# Patient Record
Sex: MALE | Race: WHITE
[De-identification: names, ages, dates, MRNs, and addresses within clinical notes are randomized per-mention and may not be internally consistent; named-entity substitution may affect disease eponyms.]

---

## 2018-06-21 NOTE — EKG
[facility]
                                    [address]
                                  [place], [place]  [address]
_________________________________________________________________________________________
                                                                 Signed   
 
 
Sinus rhythm with premature supraventricular complexes
Otherwise normal ECG
No previous ECGs available
Confirmed by TALISHA HENDERSON MD ([id number]) on 6/21/2018 6:27:12 PM
 
 
 
 
 
 
 
 
 
 
 
 
 
 
 
 
 
 
 
 
 
 
 
 
 
 
 
 
 
 
 
 
 
 
 
 
 
 
 
 
 
    Electronically Signed By: TALISHA HENDERSON MD  06/21/18 1827
_________________________________________________________________________________________
PATIENT NAME:     PARISH HURTADO                  
MEDICAL RECORD #: [id number]                     Electrocardiogram             
          ACCT #: [id number]  
DATE OF BIRTH:   [birth year]                                       
PHYSICIAN:   TALISHA HENDERSON MD                   REPORT #: [id number]
REPORT IS CONFIDENTIAL AND NOT TO BE RELEASED WITHOUT AUTHORIZATION

## 2018-09-20 ENCOUNTER — HOSPITAL ENCOUNTER (INPATIENT)
Dept: HOSPITAL 46 - ED | Age: 78
LOS: 4 days | Discharge: NURSING, REHAB OR LONG-TERM CARE | DRG: 871 | End: 2018-09-24
Attending: INTERNAL MEDICINE | Admitting: INTERNAL MEDICINE
Payer: MEDICARE

## 2018-09-20 VITALS — WEIGHT: 158.29 LBS | HEIGHT: 71 IN | BODY MASS INDEX: 22.16 KG/M2

## 2018-09-20 DIAGNOSIS — F03.91: ICD-10-CM

## 2018-09-20 DIAGNOSIS — N40.1: ICD-10-CM

## 2018-09-20 DIAGNOSIS — R33.8: ICD-10-CM

## 2018-09-20 DIAGNOSIS — N32.3: ICD-10-CM

## 2018-09-20 DIAGNOSIS — J44.9: ICD-10-CM

## 2018-09-20 DIAGNOSIS — Z79.4: ICD-10-CM

## 2018-09-20 DIAGNOSIS — N28.1: ICD-10-CM

## 2018-09-20 DIAGNOSIS — E11.9: ICD-10-CM

## 2018-09-20 DIAGNOSIS — K86.9: ICD-10-CM

## 2018-09-20 DIAGNOSIS — A40.3: Primary | ICD-10-CM

## 2018-09-20 DIAGNOSIS — K76.89: ICD-10-CM

## 2018-09-20 DIAGNOSIS — Z87.891: ICD-10-CM

## 2018-09-20 DIAGNOSIS — J96.11: ICD-10-CM

## 2018-09-20 DIAGNOSIS — R53.81: ICD-10-CM

## 2018-09-20 DIAGNOSIS — M54.9: ICD-10-CM

## 2018-09-20 DIAGNOSIS — K40.90: ICD-10-CM

## 2018-09-20 DIAGNOSIS — I48.91: ICD-10-CM

## 2018-09-20 DIAGNOSIS — J18.9: ICD-10-CM

## 2018-09-20 NOTE — EKG
[facility]
                                    [address]
                                  [place]  [address]
_________________________________________________________________________________________
                                                                 Signed   
 
 
Atrial flutter with variable AV block with premature ventricular or aberrantly
conducted complexes
Abnormal ECG
When compared with ECG of 20-SEP-2018 13:23, (Unconfirmed)
ST no longer depressed in Inferior leads
Confirmed by MESHA BLACK DO ([id number]) on 9/20/2018 9:11:32 PM
 
 
 
 
 
 
 
 
 
 
 
 
 
 
 
 
 
 
 
 
 
 
 
 
 
 
 
 
 
 
 
 
 
 
 
 
 
 
 
    Electronically Signed By: MESHA BLACK DO  09/20/18 2111
_________________________________________________________________________________________
PATIENT NAME:     PARISH HURTADO JR                 
MEDICAL RECORD #: [id number]                     Electrocardiogram             
          ACCT #: [id number]  
DATE OF BIRTH:   [birth year]                                       
PHYSICIAN:   MESHA BLACK DO                     REPORT #: [id number]
REPORT IS CONFIDENTIAL AND NOT TO BE RELEASED WITHOUT AUTHORIZATION

## 2018-09-20 NOTE — NUR
PATIENT RESTING IN BED, TACHYPNEIC WITH NON-PRODUCTIVE COUGH. ASSISTED TO
BEDSIDE COMDODE WITH 1PA, ABLE TO VOID SMALL AMOUNT OF URINE. PATIENT NOW
RESTING IN BED AGAIN. REPORTS ABD DISCOMFORT, SCHEDULED TYLENOL GIVEN. DENIES
FURTHER NEEDS. BLADDER SCAN DONE, 115 ML NOTED IN BLADDER, MD AWARE. CALL
LIGHT WITHIN REACH, BED ALARM ON.

## 2018-09-20 NOTE — NUR
PATIENT RESTING IN BED WITH EYES CLOSED, BREATHING IS EVEN AND UNALABORED.
FLACC SCORE OF 0. ASSESSMENT DONE. PATIENT DENIES NEEDS AT THIS TIME. CALL
LIGHT WITHIN REACH, BED ALARM ON.

## 2018-09-20 NOTE — EKG
[facility]
                                    [address]
                                  [place], [place]  [address]
_________________________________________________________________________________________
                                                                 Signed   
 
 
Atrial flutter with 2:1 AV conduction
Nonspecific ST abnormality
Abnormal ECG
When compared with ECG of 20-SEP-2018 12:11, (Unconfirmed)
ST less depressed in Inferior leads
Confirmed by MESHA BLACK DO ([id number]) on 9/20/2018 9:11:14 PM
 
 
 
 
 
 
 
 
 
 
 
 
 
 
 
 
 
 
 
 
 
 
 
 
 
 
 
 
 
 
 
 
 
 
 
 
 
 
 
    Electronically Signed By: MESHA BLACK DO  09/20/18 2111
_________________________________________________________________________________________
PATIENT NAME:     PARISH HURTADO                  
MEDICAL RECORD #: [id number]                     Electrocardiogram             
          ACCT #: [id number]  
DATE OF BIRTH:   [birth year]                                       
PHYSICIAN:   MESHA BLACK DO                     REPORT #: [id number]
REPORT IS CONFIDENTIAL AND NOT TO BE RELEASED WITHOUT AUTHORIZATION

## 2018-09-20 NOTE — XMS
PreManage Notification: PARISH HURTADO MRN:[id number]
 
Security Information
 
Security Events
No recent Security Events currently on file
 
 
 
CRITERIA MET
------------
- Group Notification
 
 
CARE PROVIDERS
There are no care providers on record at this time.
 
Michelle has no Care Guidelines for this patient.
 
JAIDEN VISIT COUNT (12 MO.)
-------------------------------------------------------------------------------------
2 [facility]
-------------------------------------------------------------------------------------
TOTAL 2
-------------------------------------------------------------------------------------
NOTE: Visits indicate total known visits.
 
ED/C VISIT TRACKING (12 MO.)
-------------------------------------------------------------------------------------
09/20/2018 12:08
[facility] [place]
 
TYPE: Emergency
 
COMPLAINT:
- SOB
-------------------------------------------------------------------------------------
06/20/2018 13:07
[facility] [place]
 
TYPE: Emergency
 
COMPLAINT:
- DIFFICULTY BREATHING,ABD PAIN
 
DIAGNOSES:
- Long term (current) use of aspirin
- Chronic obstructive pulmonary disease, unspecified
- Unspecified abdominal pain
- LONG TERM (CURRENT) USE OF ORAL HYPOGLYCEMIC DRUGS
- Other long term (current) drug therapy
- Long term (current) use of opiate analgesic
- Shortness of breath
- Unspecified abdominal pain
- Heart failure, unspecified
- Chronic pain syndrome
- Personal history of nicotine dependence
- Type 2 diabetes mellitus without complications
-------------------------------------------------------------------------------------
 
 
 
INPATIENT VISIT TRACKING (12 MO.)
No inpatient visits to display in this time frame
 
[email or web address]

## 2018-09-20 NOTE — NUR
PATIENT RESTING IN BED WITH EYES CLOSED, BREATHING IS EVEN AND UNLABORED. CALL
LIGHT WITHIN REACH, BED ALARM ON.

## 2018-09-20 NOTE — NUR
PATIENT RESTING WITH EYES CLOSED, BREATHING IS EVEN AND UNLABORED, RR IS 17,
O2 SATURATION IS 95% ON 2L O2. FLACC SCORE OF 0. CALL LIGHT WITHIN REACH, BED
ALARM ON.

## 2018-09-20 NOTE — EKG
[facility]
                                    [address]
                                  [place], [place]  [address]
_________________________________________________________________________________________
                                                                 Signed   
 
 
Atrial flutter with 2:1 AV conduction
Nonspecific ST abnormality
Abnormal QRS-T angle, consider primary T wave abnormality
Abnormal ECG
When compared with ECG of 20-SEP-2018 12:10, (Unconfirmed)
No significant change was found
Confirmed by MESHA BLACK DO ([id number]) on 9/20/2018 9:11:12 PM
 
 
 
 
 
 
 
 
 
 
 
 
 
 
 
 
 
 
 
 
 
 
 
 
 
 
 
 
 
 
 
 
 
 
 
 
 
 
    Electronically Signed By: MESHA BLACK DO  09/20/18 2111
_________________________________________________________________________________________
PATIENT NAME:     PARISH HURTADO                  
MEDICAL RECORD #: [id number]                     Electrocardiogram             
          ACCT #: [id number]  
DATE OF BIRTH:   [birth year]                                       
PHYSICIAN:   MESHA BLACK DO                     REPORT #: [id number]
REPORT IS CONFIDENTIAL AND NOT TO BE RELEASED WITHOUT AUTHORIZATION

## 2018-09-21 NOTE — NUR
PT BACK TO BED FROM COMMODE. PT HAS DIFFICULTY REMEMBERING HIS BECERRA CATH, AND
WHAT IT IS. PT IS IMPULSIVE WITH MOVEMENTS, HOWEVER IS POLITE AND COOPERATIVE.

## 2018-09-21 NOTE — NUR
Nurse deferred PT eval today due to pt. experiencing chest pain. Will
re-attempt eval as mediacally appropriate.

## 2018-09-21 NOTE — NUR
PATIENT RESTING IN BED WITH EYES CLOSED, BREATHING IS EVEN NAD UNLABORED, RR
IS 18, O2 SATURATION % ON 2L O2 VIA NC. FLACC SCORE OF 0. CALL LIGHT
WITHIN REACH, BED ALARM ON.

## 2018-09-21 NOTE — NUR
APPROACHED BY CHARGE NURSE PATIENT HAS AWOKEN AND IS VERY ANXIOUS, HR
JUMPINGING INTO 'S AND O2 HAD TO BE TURNED UP TO 6L/NC TO GET SATS UP
FROM 88%, BUT PATIENT HAD PULLED HIS O2 OFF. PATIENT EXTREMELY ANXIOUS. PM
MEDS HAVE BEEN GIVEN.  WAS CALLED AND AN ORDER FOR 1MG IV ATIVAN WAS
GIVEN. THIS CALMED PATIENT DOWN RIGHT AWAY. PATIENT'S HEART RATE AND BREATHING
CAME BACK DWN INTO EXCEPTABLE PARAMETERS. IV LOPRESSOR HAD BEEN ORDER, BUT
 SAID TO HOLD THE DOSE SINCE THE PATIENT WAS STABLIZING, BUT TO STILL
GIVE HIM HIS SEROQUEL IF HE WOKE UP. PATIENT RESTING QUEITLY NOW. SATS 95-96%
ON 4L/NC.

## 2018-09-21 NOTE — NUR
PT CONTIOUES TO C/O CHEST PAIN, NEW ORDERS EKG TO BE COMPLETED, AND A NEW IV
LINE STARTED ALSO AT THIS TIME. DR WILL COME AND SEE PT

## 2018-09-21 NOTE — EKG
[facility]
                                    [address]
                                  [place]  [address]
_________________________________________________________________________________________
                                                                 Signed   
 
 
Atrial fibrillation with rapid ventricular response
Abnormal QRS-T angle, consider primary T wave abnormality
Abnormal ECG
When compared with ECG of 20-SEP-2018 14:18,
Atrial fibrillation has replaced Atrial flutter
Non-specific change in ST segment in Inferior leads
Confirmed by MESHA BLACK DO ([id number]) on 9/21/2018 3:51:37 PM
 
 
 
 
 
 
 
 
 
 
 
 
 
 
 
 
 
 
 
 
 
 
 
 
 
 
 
 
 
 
 
 
 
 
 
 
 
 
    Electronically Signed By: MESHA BLACK DO  09/21/18 1551
_________________________________________________________________________________________
PATIENT NAME:     PARISH HURTADO                  
MEDICAL RECORD #: [id number]                     Electrocardiogram             
          ACCT #: [id number]  
DATE OF BIRTH:   [birth year]                                       
PHYSICIAN:   MESHA BLACK DO                     REPORT #: [id number]
REPORT IS CONFIDENTIAL AND NOT TO BE RELEASED WITHOUT AUTHORIZATION

## 2018-09-21 NOTE — NUR
PT HEART RATE IN 'S 'S AT TIMES, NEW ORDERS RECEIVED, PT IS IN
AFIB WITH RVR AND WAS GIVEN 5MG LOPRESSOR IVP AND THEN 25MG PO. PT IS UP TO
THE BEDSIDE COMMODE ALSO AT THIS TIME. HE WAS C/O CHEST PAIN WITH THIS
ELEVATED HEART RATE. ONCE HEART RATE DECREASED TO 'S HIS CHEST PAIN WAS
NONE.

## 2018-09-21 NOTE — NUR
UPDATED DR. BLACK REGARDING PATIENT'S LOW URINE OUTPUT. DR. BLACK STATES TO
CONTINUE TO MONITOR URINE OUTPUT UNTIL MORNING, WILL RE-EVALUATE NEED FOR
CATHETER IN AM.

## 2018-09-21 NOTE — NUR
BLADDER SCANNED PT  ML PT NOT ABLE TO VOID,
CALLED  TO UPDATE, ORDER GIVEN FOR
BECERRA CATH PLACEMENT.

## 2018-09-21 NOTE — NUR
16FR BECERRA PLACED USING STERILE TECHNIQUE, PT TOLERATED WELL, SECURED.
OBTAINED 400ML DARK YELLOW URINE. PT STATES HE IS COMFORTABLE. DAUGHTER HAS
ARRIVED AND IS SITTING WITH PT.

## 2018-09-21 NOTE — NUR
REPORTS 8/10 PAIN IN ABD AND RADIATES TO FLANKS, PATIENT STATES "I THINK IT IS
FROM ALL THE COUGHING I HAVE BEEN DOING." PRN OXYCODONE GIVEN. PATIENT
ASSISTED TO BEDSIDE COMMODE, WAS ABLE TO VOID. NOW RESTING IN BED AGAIN,
BREATHING IS TACHY, O2 SATURATION IS 95% ON 2L O2 VIA NC. RT IN TO DO
BREATHING TREATMENT. PATIENT DENIES FURTHER NEEDS AT THIS TIME. CALL LIGHT
WITHIN REACH, BED ALARM ON.

## 2018-09-21 NOTE — NUR
PATIENT STILL RESTING QUIETLY IN BED. RESPIRATIONS EVEN AND REGULAR AT A RATE
OF 18. EYES CLOSED AND APPEARS TO BE RESTING COMFORTABLLY.

## 2018-09-21 NOTE — NUR
PATIENT RESTING PEACEFULLY ON 1.5L/NC IN BED WITH HEAD OF BED ABOUT 30
DEGREES. EYES CLOSED RESPIRATIONS EVEN AND REGULAR AND PATIENT APPEARS IN NO
DISTRESS.

## 2018-09-21 NOTE — NUR
IV SITE INTACT, NO REDNESS OR SWELLING NOTED, FLUIDS INFUSING EASILY. PT AWAKE
AND ALERT APPEARS SLIGHTLY AGITATED, PT REPORTS 6/10 PAIN IN ABD/FLANK. 10MG
PO OXYCODONE GIVEN. PT NOT ABLE TO EAT BREAKFAST, BUT DRANK AN ENSURE. PT
ALERT AND ORIENTED X4, HOWEVER IS VERY FORGETFUL. PT UP TO BEDSIDE COMMODE,
SLIGHTLY IMPULSIVE WHEN GETTING UP. PT DIRECTABLE AND POLITE. PT DENIES NAUSEA
AND SOB AT THIS TIME, SPUTUM SAMPLE OBTAINED.

## 2018-09-21 NOTE — NUR
PT ARRIVED ON THE UNIT IN BED FROM CCU.  PT IS ANXIOUS, HARD OF HEARING, AND
BLIND IN ONE EYE, POOR SIGHT IN OTHER EYE.  PT IS FORGETFUL, OFTEN REPEATS
HIMSELF.  PT USES O2, CHRONIC AT 1.5L.  BECERRA CATH IN PLACE.  PT USES BSC.  PT
DISLIKES ANY MOISTURE ON SKIN, DOES NOT LIKE TO USE WIPES.  PT LIKES TO WIPE
HIMSELF.  PT ON TELE, TACHY IN .

## 2018-09-22 NOTE — NUR
WAS NOTIFIED THAT PATIENT HAS HAD LOW URINE OUTPUT AND HE INFORMED
NURSING TO MONITOR AND HE WOULD LOOK AT THE ISSUE AND PATIENT'S LABS IN THE
MORNING.

## 2018-09-22 NOTE — NUR
PT SLEPT OFF AND ON THROUGHOUT DAY. FREQUENT COUGH, UNPRODUCTIVE. [place]. TELE
IRREGULAR. SS INSULIN STARTED FOR BS . PT REPORTS SOME CHRONIC BACK
PAIN. BECERRA DRAINING LIGHT YELLOW QS. 4LNC. LUNG SOUND COARSE.

## 2018-09-22 NOTE — NUR
pt was assisted from chair back to bed. pt is resting safely with call light
in reach and bed alarm on. pt asked for a hot pack for his lower back.

## 2018-09-22 NOTE — NUR
PT SLEEPING SOUNDLY ALL MORNING.  WILL WAKE FOR CARES, COOPERATIVE, NO NOTED
ANXIETY.  CHANGED OXYGEN TO NASAL CANULA.

## 2018-09-22 NOTE — NUR
ADMINSTERED SCHEDULED MEDS. HAD TO WAKE PT TO TAKE THEM. DENIES CONCERNS.
STATES HE IS IN THE HOSPITAL FOR BREATHING BUT DOESN'T KNOW THE TOWN AND ASKED
WHEN THE MAIL WOULD BE DELIVERED. PT BACK TO SLEEP QUICKLY.

## 2018-09-22 NOTE — NUR
pt is resting in bed safely with call light in reach. pt asked for an ensure.
pt was offered to get up and sit in the chair, but pt said his back is hurting
too much for that right now.

## 2018-09-22 NOTE — NUR
PATIENT'S SATS DROPPING TO 88% ON 4L/NC. PATIENT IS MOUTH BREATHING. PATIENT
PLACED ON 4L/OXYMASK AND BACK TO 95% AT THIS TIME.

## 2018-09-22 NOTE — NUR
RN DEFERRED PATIENT'S PHYSICAL THERAPY EVALUATION TO THIS AFTERNOON SECONDARY
TO PATIENT HAD A ROUGH NIGHT WITH O2 SATURATION, MENTAL STATUS, AND CHEST
PAINS. WILL RE-ATTEMPT LATER TODAY.

## 2018-09-22 NOTE — NUR
VITALS AND I&OS DONE AND CHARTED. NOTIFIED RN ALANNA OF LOW OUTPUT. BED WEIGHT
DONE AND CHARTED. GARBAGES EMPTIED. BED SIDE TABLE AND CALL LIGHT IN REACH. PT
SLEEPING WHEN I LEFT THE ROOM.

## 2018-09-22 NOTE — NUR
REPORT RECEIVED FROM HILARY CANNON. PT ASLEEP AT TIME OF REPORT BUT NOW WORKING
WITH PHYS THER. APPEARS TO BE TOLERATING WELL.

## 2018-09-22 NOTE — NUR
pt is resting in bed safely with call light in reach. pt did not want anything
for breakfast, but did drink some water. pt did not need anything else and
appears to be very sleepy, nurse aware.

## 2018-09-22 NOTE — NUR
PT'S DAUGHTER CALLED REQUESTING UPDATE.  GAVE UPDATE ON OVERNIGHT EVENTS.  SHE
HAS QUESTIONS ABOUT HIS HEART RATE AND  CHEST PAINS THAT HAS HAPPENED PRIOR TO
THIS ILLNESS.  RN ANSWERED WHAT I COULD AND DIRECTED HER TO SPEAK WITH HIS PCP
OR THE HOSPITALIST WHEN SHE IS AT THE HOSPITAL.  DAUGHTER IS NOT FEELING WELL
SO IS NOT GOING TO VISIT TODAY.

## 2018-09-22 NOTE — NUR
RECIEVED BEDSIDE REPORT FROM HILARY MONDRAGON.  PT SLEEPING SOUNDLY, SNORING.  DID
NOT WAKE TO VOICES IN ROOM.  PT HAD ORDER FOR 50MG SEROQUEL ONCE FROM LAST
NIGHT, NOT GIVEN DUE TO PT SLEEPING. MD AWARE. CHARTED AS NOT GIVEN.  PER MD,
RESUME NORMAL SCHEDULEING TODAY.  PT ON 4L O2 VIA OXYMASK.

## 2018-09-23 NOTE — NUR
PATIENT HAD A PRETTY GOOD NIGHT EXCEPT FOR NEEDING A BREATHING TREATMENT IN
THE MIDDLE OF THE NIGHT WHEN HE GOT MORE WHEEZY AND THEN AGAIN AFTER 6 AM
PATIENT BECAME VERY ANXIOUS SAID HE COULDN'T BREATH, BUT SATS WERE IN THE HIGH
90'S. PATIENT WAS GIVEN A BREATHING TREATMENT AND SEEMED TO BE MOVING AIR
FAIRLY WELL BUT HAVING EXTREME ANXIETY. CALLED DR. LY AND GOT AN ORDER FOR
0.5MG IV ATIVAN AND PATIENT CALLED DOWN GOT BACK IN BED, RELAXED HEART RATE
DECREASED FROM 'S DOWN TO THE 'S. PATIENT ALSO HAD A LARGE
BOWEEL MOVEMENT. PATIENT RESTING QUIETLY WHEN AM REPORT GIVEN.

## 2018-09-23 NOTE — NUR
VITALS AND BLOOD SUGAR DONE AND CHARTED. INFORMED RN OF RESULTS BEDSIDE TABLE
AND CALL LIGHT IN REACH.

## 2018-09-23 NOTE — NUR
PATIENT AWAKE AND WANTED TO SIT UP. PATIENT UP IN RECLINER WITH CHAIR ALARM.
PATIENT IS WHEEZING MORE AND SATS DROPPED TO 88% ON THE NC SO SWITCHED TO
4L/OXYMASK AND RT CALLED TO COME GIVE A PRN NEB.

## 2018-09-23 NOTE — NUR
PATIENT RESTING IN BED, PATIENT DOES NOT AWAKE WHEN THIS CNA SPEAKS TO
PATIENT. RN IN ROOM WITH PATIENT AT THIS TIME. CALL LIGHT IN REACH. NO OTHER
NEEDS.

## 2018-09-23 NOTE — NUR
PT APPEARS MORE LETHARGIC THIS SHIFT, SLEPT MOST OF DAY.  WAKES EASILY TO
VOICE AND TOUCH.  PT C/O PAIN MORE,PRN TYLENOL EFFECTIVE "FOR ABOUT 5
MINUTES".  NARCOTICS DC'D DUE TO DROWSINESS.  PT TOLERATES WEANING O2 DOWN TO
2L VIA NC.  PER MD, KEEP O2 BETWEEN 89%-92%.  WET, PRODUCTIVE COUGH MORE
FREQUENT.  PT REPORTS IMPROVED APPITITE.  EATS MOST OF EACH MEAL AND DRINKS
ENSURES.  PT REFUSED SHOWER AND BED BATH MULTIPLE TIMES.

## 2018-09-23 NOTE — NUR
ASSESSMENT COMPLETE. SCHEDULED MEDICATIONS ADMINISTERED. PRN PAIN MEDICATION
GIVEN FOR 8/10 PAIN IN ABD. PT SITTING ON EDGE OF BED. CRACKLES IN BILATERAL
LOWER LOBES. BECERRA DRAINING WNL. CONTINUOUS PULSE OX WNL ON 2 L NC. TELE #4-
IRREGULAR HR. IV SALINE LOCKED, FLUSHES WNL. ENSURE AND ICE WATER GIVEN PER PT
REQUEST. ALERT AND ORIENTED X2, WHEN ASKED WHERE HE WAS PT STATED, "AT THE
[employer]" AND WAS REORIENTED, PT WAS UNSURE OF CURRENT YEAR BUT WAS ABLE
TO STATE HIS BIRTHDAY. CALL LIGHT IN REACH. BED ALARM ON.

## 2018-09-23 NOTE — NUR
PT REPORTED THAT HE FEELS SOB, O2 SATS DOWN TO 82.  INCREASED WITH
REASSURANCE.  RT RUTH CALLED, HE WILL COME OVER TO ASSIST.

## 2018-09-23 NOTE — NUR
PATIENT HAS HAD HIS BREATHING TREATMENT AND IS RESTING QUIETLY IN THE RECLINER
NOW. WHEEZING HAS RESOLVED. SATS 97% ON 4L/OXYMASK.

## 2018-09-23 NOTE — NUR
GAVE PT A TURKEY SAND BOX PER HIS REQUEST AND OK FROM HIS RN ALANNA. FRESH
WATER GIVEN. CALL LIGHT AND BEDSIDE TABLE IN REACH.

## 2018-09-23 NOTE — NUR
THIS CNA ASKED PATIENT IF HE WOULD LIKE TO SHOWER. PATIENT APPEARED TO BE
AGITATED AND STATED THAT HE WANTS "YOU PEOPLE TO LEAVE ME ALONE, IM SICK AND
DONT FEEL GOOD AND WANT TO BE LEFT ALONE". THIS CNA EXPLAINED TO PATIENT THE
BENEFITS OF A SHOWER AND THAT IT MAY HELP HIM FEEL BETTER, PATIENT AGITATION
INCREASED AND PATIENT STATED THAT "YOU CANT FORCE ME TO TAKE A SHOWER, IM NOT
TAKING ONE." PATIENT RESTING IN BED, CALL LIGHT IN REACH. RN NOTIFIED, NO
OTHER NEEDS AT THIS TIME.

## 2018-09-23 NOTE — NUR
PATIENT SITTING UP IN BEDSIDE RECLINER, CALL LIGHT IN REACH, CHAIR ALARM ON.
NO OTHER NEEDS AT THIS TIME. LINENS CHANGED.

## 2018-09-23 NOTE — NUR
IN ROOM TO ASSIST PT TO LAY BACK IN BED. CONTINUOUS PULSE OX WNL ON 2 L NC. NO
NEEDS AT THIS TIME. BED ALARM ON, CALL LIGHT IN REACH.

## 2018-09-23 NOTE — NUR
VITALS AND I&OS DONE AND CHARTED. GARBAGES EMPTIED. BEDSIDE TABLE AND CALL
LIGHT IN REACH. FRESH WATER GIVEN.

## 2018-09-23 NOTE — NUR
BECERRA WAS CLAMPED FOR 3 HRS.  PT HAD 150ML OUTPUT WHEN UNCLAMPED.  PT DEINED
FEELING THE NEED TO VOID.  THERE WAS A LARGE AMOUNT OF URINE ON BEDSHEETS WHEN
PT MOVED TO CHAIR.  DR LY NOTIFIED, WILL RECOMMEND FOLLOW UP WITH UROLOGY
AFTER DISCHARGE AND WE WILL LEAVE BECERRA FOR NOW.

## 2018-09-23 NOTE — NUR
RECIEVED BEDSIDE REPORT FROM HILARY MONDRAGON.  PT SLEEPING SOUNDLY, TALKING
INCOHERANTLY IN HIS SLEEP.  O2 AT 4L VIA OXYMASK.  PT APPEARS COMFORTABLE IN
BED AT THIS TIME.

## 2018-09-23 NOTE — NUR
RECIEVED REPORT FROM HILARY CANNON. PT SITTING UP IN BED, SLEEPING, AWAKES
EASILY. CONTINUOUS PULSE OX- O2 SATURATION WNL ON 2 L NC. BECERRA DRAINING WNL.
IV SALINE LOCKED. TELE #4- IRREGULAR HR. BED ALARM ON. CALL LIGHT IN REACH

## 2018-09-23 NOTE — NUR
PT REMAINS VERY SLEEPY.  WILL WAKE TO LOUD VOICE AND TOUCH.  PT SEEMS MORE
DISORIENTED TODAY THAN PREVIOUSLY.  PT LUNGS ARE VERY WHEEZY AND COARSE.  PER
PRINCE, RT, TRIAL REDUCTION IN 02 TO KEEP SATS AT MID-90S.  DECREASED O2 TO
2L NASAL CANULA. GOOD URINE OUTPUT.  TELEPHONE ORDER FROM DR LY TO CLAMP
BECERRA TO ASSESS NEED TO VOID. BECERRA CLAMPED AT 1020.

## 2018-09-23 NOTE — NUR
PT UP TO CHAIR WITH PHYSICAL THERAPY.  PT TOLERATED WELL, CHAIR ALARM ON.  PT
SEEMS MORE AWAKE, DOZES OFF, BUT WAKES TO VOICE.  REPORTS APPITITE IS
IMPROVING.  ONE-TIME DOSE OF CATCH UP MEDS GIVEN.

## 2018-09-23 NOTE — NUR
PT HAS BEEN UP IN CHAIR.  TOLERATING WELL.  BECERRA CATH LEAKING.  RN WILL
REASSESS.  PT LAYING BACK DOWN IN BED.

## 2018-09-23 NOTE — NUR
APPROCHED PT SEVERAL TIMES RE: TAKING A SHOWER.  PT EMPHATICALLY DECLINED TO
GET IN THE SHOWER CITING PAIN AND FATIGUE.  PT WAS GIVEN PRN TYLENOL, STILL
DECLINED.  PT SLEEPY MOST OF SHIFT.  MD AWARE.

## 2018-09-23 NOTE — NUR
PATIENT CONTINUES TO SIT IN THE RECLINER AND IS GETTING HUNGRY. PATIENT DRANK
AN INSURE AND IS NOW EATING A TURKEY SANDWHICH. SATS 99% ON 4L/NC.

## 2018-09-24 ENCOUNTER — HOSPITAL ENCOUNTER (INPATIENT)
Dept: HOSPITAL 46 - MS | Age: 78
LOS: 6 days | Discharge: HOME HEALTH | DRG: 91 | End: 2018-09-30
Attending: INTERNAL MEDICINE | Admitting: INTERNAL MEDICINE
Payer: MEDICARE

## 2018-09-24 VITALS — BODY MASS INDEX: 21.57 KG/M2 | HEIGHT: 71 IN | WEIGHT: 154.1 LBS

## 2018-09-24 DIAGNOSIS — Z79.51: ICD-10-CM

## 2018-09-24 DIAGNOSIS — I48.0: ICD-10-CM

## 2018-09-24 DIAGNOSIS — R33.8: ICD-10-CM

## 2018-09-24 DIAGNOSIS — E11.9: ICD-10-CM

## 2018-09-24 DIAGNOSIS — K86.9: ICD-10-CM

## 2018-09-24 DIAGNOSIS — R93.49: ICD-10-CM

## 2018-09-24 DIAGNOSIS — N32.0: ICD-10-CM

## 2018-09-24 DIAGNOSIS — R26.81: Primary | ICD-10-CM

## 2018-09-24 DIAGNOSIS — J13: ICD-10-CM

## 2018-09-24 DIAGNOSIS — K76.89: ICD-10-CM

## 2018-09-24 DIAGNOSIS — Z87.891: ICD-10-CM

## 2018-09-24 DIAGNOSIS — K40.90: ICD-10-CM

## 2018-09-24 DIAGNOSIS — R53.1: ICD-10-CM

## 2018-09-24 DIAGNOSIS — Z79.84: ICD-10-CM

## 2018-09-24 DIAGNOSIS — N28.1: ICD-10-CM

## 2018-09-24 DIAGNOSIS — K21.9: ICD-10-CM

## 2018-09-24 DIAGNOSIS — Z79.899: ICD-10-CM

## 2018-09-24 DIAGNOSIS — F03.91: ICD-10-CM

## 2018-09-24 DIAGNOSIS — E78.5: ICD-10-CM

## 2018-09-24 DIAGNOSIS — J44.0: ICD-10-CM

## 2018-09-24 DIAGNOSIS — Z79.82: ICD-10-CM

## 2018-09-24 DIAGNOSIS — N40.1: ICD-10-CM

## 2018-09-24 DIAGNOSIS — Z90.49: ICD-10-CM

## 2018-09-24 DIAGNOSIS — M54.9: ICD-10-CM

## 2018-09-24 DIAGNOSIS — G89.29: ICD-10-CM

## 2018-09-24 DIAGNOSIS — J96.11: ICD-10-CM

## 2018-09-24 DIAGNOSIS — F39: ICD-10-CM

## 2018-09-24 DIAGNOSIS — Z99.81: ICD-10-CM

## 2018-09-24 PROCEDURE — G8987 SELF CARE CURRENT STATUS: HCPCS

## 2018-09-24 PROCEDURE — G8980 MOBILITY D/C STATUS: HCPCS

## 2018-09-24 PROCEDURE — G8988 SELF CARE GOAL STATUS: HCPCS

## 2018-09-24 NOTE — NUR
REPLACED CONT PULSE OX USING ALLIGATOR CLIP ON EAR.  UNABLE TO MAINTAIN
READING USING DISPOSIBLE METERS ON FINGER, TOE, OR FOREHEAD.   PT IN WORKING
WITH PT.

## 2018-09-24 NOTE — NUR
PATIENT IN BED. PATIENT STARTED COUGHING HARD, CNA RAISED HED OF BED. RN CAME
INTO ROOM. FRESH WATER GIVEN. CALL LIGHT IN REACH. NO FURTHER NEEDS AT THIS
TIME.

## 2018-09-24 NOTE — NUR
IN ROOM TO ADMINISTER SCHEDULED BP MEDICATION. MEDICATION ADMINISTERED.
CONTINUOUS PULSE OX SATURATIONS WNL, OXYGEN TITRATED TO 1.5 L VIA NC. BECERRA
CARE COMPLETE. CALL LIGHT IN REACH, BED ALARM ON.

## 2018-09-24 NOTE — NUR
PT SLEPT ON/OFF THROUGHOUT SHIFT. PRN PAIN MEDICATION GIVEN X2. PT TITRATED TO
1.5 L NC AND SATURATED WITHIN ORDERED LIMITS, CONTINUOUS PULSE OX. TELE #4
IRREGULAR HR. . BECERRA DRAINING WNL. IV SALINE LOCKED. PT WOKE AROUND
0300 THIS MORNING WITH SOB AND ANXIETY, O2 SATURATIONS WNL, PRN BREATHING TX
ADMINISTERED, PT ABLE TO FALL BACK ASLEEP WITH NO FURTHER NEEDS. SCHEDULED BP
MEDICATIONS ADMINISTERED Q6H. PT ALERT AND ORIENTED X2, PLACE AND YEAR
UNKNOWN.

## 2018-09-24 NOTE — NUR
PT COMPLAINS OF ABD PAIN.  DIAG IMAGING IN ROOM TO DO ECHO.  DAUGHTER AT
BEDSIDE, UPSET DUE TO A DEATH IN THE FAMILY.  PT DOES NOT SEEM TO BE UPSET
ABOUT IT.  PT SEEMS MORE ANXIOUS ABOUT HIS BREATHING, BUT HIS LUNGS SOUND
BETTER.  CONT. PULSE OX OFF UNTIL ECHO COMPLETE.  ATTEMPTED TO GET PT IN
SHOWER, BUT PT HAS DECLINED SO FAR.  WILL CONTINUE TO ATTEMPT.

## 2018-09-24 NOTE — NUR
RECEIVED REPORT FROM HILARY CANNON. PT LAYING IN BED, APPEARS TO BE SLEEPING.
CONTINUOUS PULSE OX- WNL ON 2 LITERS OXYGEN VIA NC. TELE 4- SINUS RHYTHM.
BECERRA DRAINING WNL. CALL LIGHT NEXT TO PT.

## 2018-09-24 NOTE — NUR
PT REQUESTS PRN BREATHING TREATMENT.  CALLED RT TO PROVIDE TREATMENT.  RT FARNAZ
WILL BE DOWN IN A FEW MINTUES.

## 2018-09-24 NOTE — NUR
PRN PAIN MEDICATION GIVEN TO PT FOR PAIN 7/10 IN ABD AND BACK. ASSISTED PT
BACK TO BED. WARM BLANKET GIVEN TO PT. CALL LIGHT IN REACH.

## 2018-09-24 NOTE — NUR
PT HAD EPISODE OF SEVERE COUGHING AND HYPOXIA (70S).  CHANGED O2 TO OXYMASK AT
2L, MAINTAINED SAT LEVEL AT 95%.  PT APPEARS COMFORTABLE IN BED AT THIS TIME.
SUCTION AT BEDSIDE.  PT IS VERY ANXIOUS, BUT CALMS WITH PRESENCE AND TALK.  PT
REPORTS COUGHING UP "JUNK".

## 2018-09-24 NOTE — NUR
PT CHANGED TO SWING BED.  ONE EPISODE OF SEVERE COUGHING THAT CAUSED GAGGING.
NO EMISIS.  AFIB CONVERTED TO NSR THIS MORNING.  MAINTAINED NSR, HR 70'S. O2
VIA NC AT 2L, OXYMASK PRN.  PT REPORTS COUGHING, BUT NOTHING COMING UP.
DECREASED APITITE, BUT DRINKS ENSURES WELL.  PT APPEARS MORE AWAKE.  LIDOCAINE
PATCH ON BACK AND PRN TYLENOL EFFECTIVE FOR PAIN CONTROL.  MULTIPLE LARGE BM
THIS SHIFT.  BECERRA CATH IN PLACE, DRAINING QS YELLOW URINE.

## 2018-09-24 NOTE — NUR
IN ROOM TO CHECK ON PT. PT ASLEEP IN BED, AWAKES EASILY. CONTINUOUS PULSE OX
WNL ON 1.5 L OXYGEN VIA NC. BED ALARM ON, CALL LIGHT IN REACH.

## 2018-09-24 NOTE — NUR
VITALS AND I&OS DONE AND CHARTED. GARBAGES EMPTIED. BEDSIDE TABLE AND CALL
LIGHT IN REACH. PT NEEDS NOTHING AT THIS TIME.

## 2018-09-24 NOTE — NUR
CNA REPORTED THAT PT'S IV SITE WAS STILL BLEEDING AFTER TERESA PRESSURE AND
WRAPPING.  RN ASSESSED SITE, NO LONGER BLEEDING, RN APPLIED ANOTHER LAYER OF
WRAP.  WILL REASSESS SHORTLY.

## 2018-09-24 NOTE — NUR
PATIENT WAS ADMITTED AT HIGH RISK FOR MALNUTRITION DUE TO POOR APPETITE.  HE
LIVES WITH HIS DAUGHTER. HE HAS DEMENTIA.  HE SEEMS TO BE EATING DINNER WELL
BUT DOESN'T ALWAYS EAT BREAFKAST BUT WILL DRINK ENSURE.  CURRENT DIET: 1800
ADA.  BLOOD SUGARS IN CONTROL. POC GLUCOSE FROM 9/23: 146, 181, 107, 103.
LANTUS D/C'D.  CONTINUE TO PROVIDE ENSURE/GLUCERNA IF PATIENT DOESN'T EAT A
MEAL.  WILL CONTINUE TO MONITOR.

## 2018-09-24 NOTE — NUR
PATIENT UP TO BSC AND BACK TO SITTING ON SIDE OF BED
1 PERSON ASSIST. CALL LIGHT IN REACH. NO
FURTHER NEEDS AT THIS TIME.

## 2018-09-24 NOTE — NUR
RECIEVED BEDSIDE REPORT FROM HILARY CHAN AND HILARY CARRILLO.  PT WAS RESTING IN
BED, WAKES EASILY TO VOICE.  PT HAD BM X2.  TYLENOL GIVEN FOR BACK PAIN X2.
TELE #4 SHOWS IRREGULAR AND TACHY.  IV S/L AND NEEDS CHANGED TODAY. BECERRA IN
PLACE, NO LEAKING NOTED OVERNIGHT.

## 2018-09-24 NOTE — NUR
ASSESSMENT COMPLETE. PT C.O HEADACHE, PRN PAIN MEDICATION ADMINISTERED. PT SOB
WITH ANXIETY SITTING AT EDGE OF BED, RT NOTIFIED, BREATHING TX ADMINISTERED.
LUNG SOUNDS DIMINISHED, COARSE. CONTINUOUS PULSE OXIMETRY WNL ON 2 L OXYGEN
NC. PRODUCTIVE COUGH. TELE 4- SINUS RHYTHM. CALL LIGHT NEXT TO PT.

## 2018-09-24 NOTE — NUR
PT APPEARS MORE RELAXED, RESTING IN BED.  O2 VIA OXYMASK AT 2L, MAINTAINING O2
AT 94-95%.  PT IS COUGHING, BUT NOT GETTING ANYTHING UP.  RN CAN HEAR
"RATTLES" IN BACK OF THROAT.  WILL DISCUSS ADDING AN EXPECTORANT WITH DR LY
WHEN HE COMES TO THE FLOOR.

## 2018-09-24 NOTE — NUR
PATIENT IN BED RESTING WITH EYES CLOSED. FRESH WATER GIVEN. CALL LIGHT IN
REACH. NO FURTHER NEEDS AT THIS TIME.

## 2018-09-25 NOTE — NUR
PT SLEPT THROUGHOUT MOST OF SHIFT. 1 EPISODE OF SOB AND ANXIETY, PRN BREATHING
TX ADMINISTERED. PRN PAIN MEDICATION ADMINISTERED X1. BECERRA DRAINING WNL. TELE
4- SINUS RHYTHM. CONTINUOUS PULSE OX WNL, TITRATED O2 TO 1.5 LITERS OXYGEN VIA
NC.

## 2018-09-25 NOTE — NUR
IN ROOM TO CHECK ON PT. PT APPEARS TO BE SLEEPING, BREATHING EVEN AND
UNLABORED. CONTINUOUS PULSE OX WNL ON 2 L NC. CALL LIGHT NEXT TO PT.

## 2018-09-25 NOTE — NUR
PATIENT SITTING UP ON SIDE OF BED. FRESHWATER GIVEN. PT IN ROOM. CALL LIGHT IN
REACH. NO FURTHER NEEDS AT THIS TIME.

## 2018-09-25 NOTE — NUR
PATIENT REPORTS FEELING BETTER. SEVERAL BREATHING TREATMENTS. PRN PAIN MEDS
ADMINISTERED X2. BECERRA DRAINING WNL. ON 1.5 L OXYGEN VIA NC. PATIENT USES CALL
LIGHT APPROPRIATELY.

## 2018-09-25 NOTE — NUR
CALL LIGHT ON. PATIENT REPORTED 8/10 PAIN WITH A HEADACHE. PRN MEDICATION
GIVEN (SEE MAR). CALL LIGHT WITHIN REACH. NO FURTHER REQUESTS AT THIS TIME.

## 2018-09-25 NOTE — NUR
CALL LIGHT ON. PATIENT REQUESTED ASSISTANCE TO COMMODE. PATIENT DID NOT HAVE A
BM. ASSISTED PATIENT BACK TO BED. PATIENT EATING LUNCH.

## 2018-09-25 NOTE — NUR
MEDICATION DUE. PATIENT SITTING ON BEDSIDE EATING LUNCH. MEDICATION GIVEN.
CALL LIGHT WITHIN REACH. NO FURTHER REQUESTS AT THIS TIME.

## 2018-09-25 NOTE — NUR
MEDICATIONS DUE. PATIENT IN BED WATCHING TV. REPORTS "I'M BEGINNING TO FEEL A
LITTLE BETTER". MEDICATIONS GIVEN (SEE MAR). CALL LIGHT WITHIN REACH. NO
FURTHER REQUESTS.

## 2018-09-25 NOTE — NUR
ASSESSMENT AND MEDICATIONS DUE. ASSESSMENT DONE. PATIENT STATES HE IS
"BREATHING BETTER" O2 SAT 94% ON 1.5 LPM VIA NC. REPORTED PAIN OF 7/10, PRN
MEDICATION GIVEN WITH MORNING MEDS (SEE MAR). CALL LIGHT WITHIN REACH. PATIENT
SITTING ON SIDE OF BED EATING BREAKFAST. NO FURTHER REQUESTS.

## 2018-09-25 NOTE — NUR
ROUNDED ON PATIENT. PATIENT REPORTS FEELING BETTER AND REQUESTED MORE PAPER TO
DRAW ON. CALL LIGHT WITHIN REACH. NO FURTHER REQUESTS.

## 2018-09-25 NOTE — NUR
PT SITTING ON SIDE OF BED, WHICH SEEMS TO BE COMFORTABLE TO HIM. HE WELCOMED
ME IN AND SHOOK MY HAND. PT IS A LITTLE HARD OF HEARING, AND WHEN HE REALIZED
WHO I WAS, SAID HE HAS BEEN PRAYING ALOT LATELY. HE FEELS A NEED TO GET HOME
TO CARE FOR HIS FAMILY. PT REQUESTED PRAYER AND INVITED ME BACK. WILL FOLLOW
AS NEEDED

## 2018-09-25 NOTE — NUR
ASSESSMENT COMPLETE. PRN PAIN MEDICATION GIVEN FOR HEADACHE AND PAIN IN HIS
CHEST THAT OCCURS WITH COUGH. LUNG SOUNDS DIMINISHED, CLEAR. 2 LITERS OXYGEN,
NASAL CANNULA. PRODUCTIVE COUGH. CALL LIGHT IN REACH. PT LAYING IN BED,
APPEARS TO BE RELAXED. SPO2 92%. BECERRA EMPTIED, BECERRA CARE COMPLETE.

## 2018-09-25 NOTE — NUR
RECEIVED REPORT FROM HILARY CHILDERS. PT SITTING AT EDGE OF BED, COMMUNICATES WITH
STAFF. OXYGEN 2 LITERS NC IN PLACE. ADA DIET, 1800 CALORIE. CALL LIGHT NEXT TO
PT.

## 2018-09-25 NOTE — NUR
IN ROOM TO REPLACE TELE BATTERY. TELE 4- SINUS RHYTHM. LIDODERM PATCH REMOVED.
O2 SATURATIONS WNL, 02 TITRATED TO 1.5 LITERS NASAL CANNULA.

## 2018-09-25 NOTE — NUR
IN ROOM TO CHECK ON PT. PT APPEARS TO BE SLEEPING COMFORTABLY, EYES CLOSED,
UNLABORED BREATHING. CONTINUOUS PULSE OX WNL ON 1.5 L NC. CALL LIGHT NEXT TO
PT.

## 2018-09-25 NOTE — NUR
IN ROOM TO CHECK ON PT. PT APPEARS TO BE SLEEPING, EYES CLOSED, UNLABORED
BREATHING. 2 L NC IN PLACE.

## 2018-09-25 NOTE — NUR
IN ROOM TO CHECK ON PT. PT APPEARS TO BE SLEEPING, EYES CLOSED, BREATHING
UNLABORED. OXYGEN SATURATIONS WNL ON 1.5 L NC. CALL LIGHT NEXT TO PT.

## 2018-09-25 NOTE — NUR
ROUNDED ON PATIENT. PATIENT RESTING WITH EYES CLOSED, RESPIRATIONS 20. CALL
LIGHT WITHIN REACH. CURTAIN OPEN TO NURSES STATION.

## 2018-09-26 NOTE — NUR
PT APPEARED TO SLEEP WELL THROUGHOUT SHIFT. 2 L OXYGEN NC, 02 SATURATIONS WNL.
LUNG SOUNDS CLEAR AND DIMINISHED. ORAL ABX. BECERRA DRAINING WNL. ADA, 1800 MILES
DIET.

## 2018-09-26 NOTE — NUR
PATIENT WAS  SETTING UP ON EDGE OF BED HAVING TROUBLE BREATHING.RN WAS CALLED
IN , PATIENT USED BED SIDE COMODE, BACK TO BED , FRESH WATER WAS GIVEN
BREAKFAST WAS BROUGHT IN.PATIENT REFUSED  HIS MEAL. BACK TO BED, RESTING,NO
OTHER NEEDS AT THIS TIME

## 2018-09-26 NOTE — NUR
REPORT RECEIVED FROM DUSTIN RICHARD, PATIENT IS AWAKE AND SITTING UP IN BED WITH
OXYGEN ON AT 2L PER NC.

## 2018-09-26 NOTE — NUR
PATIIENT RESTING IN BED WITH EYES CLOSED. FRESHWATER GIVEN. CALL LIGHT IN
REACH. NO FURTHER NEEDS AT THIS TIME.

## 2018-09-26 NOTE — NUR
PT WAS SITTING ON SIDE OF BED WHICH IS A COMFORTABLE PLACE FOR PT. HE WAS
DRAWING AND GAVE ME ONE OF HIS DRAWINGS-A CROSS HE AUTOGRAPHED FOR ME. GOD
BLESS HIM

## 2018-09-26 NOTE — NUR
PT ASSESSMENT COMPLETED. PT SITTING UP IN BED. CALL LIGHT IN REACH. O2 SAT
4LPNC. PT DENIES SOB OR PAIN. SPEECH CLEAR. PT LAUGHING AND JOKING WITH STAFF.
NO CONCERNS OR REQUESTS VOICE.

## 2018-09-26 NOTE — NUR
PT RESTING ON RIGHT LATERAL SIDE, RESPIRATIONS VISABLE AND UNLABORED. EYES
CLOSED AND PT APPEARS TO BE SLEEPING COMFORTABLY. PT REMAINS ON 4LPNC. CALL
LIGHT AND H20 INR EACHAND PT REMAINS IN VIEW OF NURSING STATION.

## 2018-09-26 NOTE — NUR
IN ROOM TO CHECK ON PT. PT AWAKE SITTING AT EDGE OF BED. PT STATED HE WAS
GOING TO DRAW. NO REQUESTS AT THIS TIME. CALL LIGHT NEXT TO PT.

## 2018-09-26 NOTE — NUR
REPORT RECEIVED FROM DAYSHIFT RN. PT RESTING IN BED, EYES CLOSED AND
RESPIRATIONS EVEN AND UNLABORED APPEARS TO BE SLEEPING COMFORTBABLY. CALL
LIGHT IN REACH AND PT IN VIEW OF NURSING STATION.

## 2018-09-26 NOTE — NUR
PATIENT IN BED RESTING. FACE WASHED, BREAKFEAST WAS BROUGHT IN LATER. OT GAVE
HER SHOWER, BED CHANGED, FRESH WATER GIVEN, NO OTHER NEEDS AT THIS TIME.

## 2018-09-26 NOTE — NUR
PO ABX GIVEN AT THIS TIME, PATIENT HAS BEEN RESTING WELL.  REMAINS ON OXYGEN
AT 2L PER NC WITHOUT C/O SOB AT THIS  TIME RESPIRATIONS AT 22

## 2018-09-26 NOTE — NUR
RT WAS CALLED INTO THE ROOM DUE TO SOB, SCHEDULED NEB TX GIVEN EARLY TO ASSIST
PATIENT OPEN HIS LUNGS UP.

## 2018-09-26 NOTE — NUR
PATIENT UP TO THE SIDE OF THE BED WORKING WITH RESPIRATORY THERAPY.  PATIENT
REMAINS ON 2L OF OXGEN PER NC AND HAS A PULSE OX ON.  HE DENIES ANY SOB AT
THIS TIME AND HAS HIS USUAL LOWER BACK PAIN WITH LIDOCAINE PATCH INTACT 3/10
PAIN.

## 2018-09-26 NOTE — NUR
PT SITTING AT EDGE OF BED. C.O. BACK PAIN. PT REPOSITIONED, PILLOW BEHIND
BACK. WARM BLANKET GIVEN. PT APPEARS TO BE COMFORTABLE.

## 2018-09-27 NOTE — NUR
IN ROOM TO ADMIN EVENING MEDS. PT AOX4, PLEASANT. PT SITTING AT BEDSIDE. PT
TOLERATED PO MEDS WELL. VSS. O2 SAT 92% ON 4LNC. PT'S LS HAVE EXP. WHEEZES,
UPPER AIRWAY CONGESTION. BASES DIM/CLEAR. OCASSIONAL PRODUCTIVE COUGH. PT
DENIES SOB/CP. BT ACTIVE. PT C/O SENSITIVE "KNOTS" ON ABDOMEN WHERE THERE IS A
MEDIUM SIZED BRUISE. PT DENIES ANY HA PAIN OR PAIN OTHERWISE, NO LIGHT
HEADEDNESS OR DIZZINESS. PULSES EQUAL AND STRONG BILATERALLY. CMS INTACT.
BECERRA DRAINING WNL. NO NEEDS AT THIS TIME. CALL LIGHT WITHIN REACH.

## 2018-09-27 NOTE — NUR
PT SITTING AS USUAL ON SIDE OF BED, DRAWING TO PASS THE TIME. PT SHOWED ME
SEVERAL TIMES PRESVivian DEJESUS'S BUS CARD HE LEFT WHEN HE STOPPED BY TO CHECK ON
PT. HE SEEMED SO IMPRESSED-SAID HE WAS GOING TO GO HOME AND FRAME HIS CARD.
WILL FOLLOW AS NEEDED

## 2018-09-27 NOTE — NUR
CHARGE NURSE ROUNDING NOTE: RESTING, EYES CLOSED, CALL LIGHT AND FLUDS AT
BEDSIDE, BED ALARM OIN, F/C PATENT, NO REQUESTS

## 2018-09-27 NOTE — NUR
PT RESTING SUPINE IN BED, EYES CLOSED AND RESPIRATIONS EVEN ADN UNLBAORED ON
4LPNC. PT APPEARS TO BE SLEEPING COMFORTABLY. CALL LIGHT AND H20 IN REACH AND
PT IN VIEW OF NURSING STATION.

## 2018-09-27 NOTE — NUR
PATIENT CALLED TO USE THE BR. 1 PERSON ASSISTED. PATIENT USED WALKER. PATIENT
BACK TO BED. CALL LIGHT WITHIN REACH. NO OTHER NEEDS AT THIS TIME.

## 2018-09-27 NOTE — NUR
PT APPEARED TO SLEEP COMFORTABLY MAJORITY OF SHIFT. PT REMAINS A/O X4. 4LPNC,
DENIES SOB. PT HAS BECERRA CATH WHICH IS DRAINING ADEQUATE AMOUNTS OF CLEAR
YELLOW URINE. PT DID REPORT SOME BACK PAIN AND UPPER ABDOMINAL DISCOMFORT.
PT STATES  THIS PAIN IS CHRONIC, COMES AND GOES AND FEELS THE SAME AS IT
USUALLY DOES WAS GIVEN MAALOX FOR ABDOMINAL DISCOMFORT AND TYLENOL FOR LOWER
BACK PAIN. BT'S WERE ACTIVE X4. NO ABD MASS WAS VISABLE OR PALPABLE.

## 2018-09-27 NOTE — NUR
RECEIVED REPORT FROM NIGHT SHIFT RN. PT APPEARS TO BE SLEEPIN. WAKES TO VERBAL
STIMULI. 4L NC IN PLACE. NO IV ACCESS. CALL LIGHT IN REACH. DENIES NEEDS
BREAKFAST ORDERED.

## 2018-09-27 NOTE — NUR
pt resting on right lateral side, respirations even and unlabored on 4lpnc. pt
appears to be sleeping comfortably. call light and h20 in reach and pt in view
of nursing station.

## 2018-09-27 NOTE — NUR
PT RESTING IN BED ON RIGHT LATERAL SIDE, RESPIRATIONS EVEN ADN UNLABORED ON
4LPNC. PT APPEARS TO BE SLEEPING COMFORTABYL CALL LIGHT AND H20 IN [facility] AND
PT IN VIEW OF NURSING STATION.

## 2018-09-27 NOTE — NUR
PT AWOKE WITH RESPIRATIONS OF 26 REPORTS SOB.
RT CALLED FOR BREATHING TREATMENT.
ALSO REPORTING BURNING SENSATION IN ABDOMEN. GLUCERNA REPORTED TO HELP. GIVEN
TO PT WITH BREAKFAST.

## 2018-09-27 NOTE — NUR
PT HAD GOOD DAY. COMPLAINED OFPAIN IN MULTIPLE AREAS. LIDODERM BETWEEN SHOLDER
BLADES. WORK WELL WITH PT. 4L NC SATING BETWEEN 88-92%. TACHYPNEIC. AT TIMES.
SCHEDULED AND PRN NEBS. MAALOX AND TYLENOL AS NEEDED. BECERRA YELLOW QS URINE.

## 2018-09-28 NOTE — NUR
PT SLEPT THROUGHOUT ENTIRE SHIFT. AOX4, LS REMAIN DIMINISHED, EXP. WHEEZES. PT
ENCOURAGED TO C/D/B. ON 3LNC. BECERRA CATHETER REMAINS IN PLACE. PT TOLERATED PO
MEDS WELL. NO C/O PAIN THIS SHIFT. NO N/V. VSS. BED ALARM ON FOR SLEEP. NO C/O
SOB/CP.

## 2018-09-28 NOTE — NUR
PT SHOWED ME SOME OF HIS LATEST DRAWINGS, ROUGH NIGHT, HOPES FOR BETTER DAY.
GOOD VISIT, WILL FOLLOW AS NEEDED

## 2018-09-28 NOTE — NUR
sitting edge of bed, visiting with staff, no c/o, no requests, call light and
fluids at bedside, f/c patent

## 2018-09-28 NOTE — NUR
medicated with 1 Tylenol 500mg po, c/o bilat rib pain right more than left.
sitting edge of bed, moist, productive cough present

## 2018-09-28 NOTE — NUR
RECEIVED REPORT FROM NIGHT SHIFT RN. PT APPEARS TO BE SLEEPING. RESPIRATIONS
ARE EQUAL AND NONLABORED. 3L NC IN PLACE. CALL LIGHT IN REACH.

## 2018-09-28 NOTE — NUR
HELPED PT FROM THE BATHROOM TO HIS BED WITH HIS FWW. BEDSIDE TABLE AND CALL
LIGHT IN REACH. PT NEEDS NOTHING ELSE AT THIS TIME.

## 2018-09-29 NOTE — NUR
pt is sitting up on side of bed safely with call light in reach. pt was
offered a shower but said he would like to wait as PT wore him out. pt asked
for a cup of coffee.

## 2018-09-29 NOTE — NUR
PT SAT AT BEDSIDE DRAWING ALL DAY. PT HAD FAMILY VISIT. GOOD APPETITE. LUNGS
CLEAR. COUGHING LESS FREQUENTLY.

## 2018-09-29 NOTE — NUR
PT CONTINUES TO SIT ON BEDSIDE DRAWING PICTURES FOR THE STAFF. DENIES CONERNS
OTHER THAN CHRONIC BACK PAIN. COUGH NOW INFREQUENT. FAMILY HAS GONE HOME.
CHEERED HIM UP TO HAVE THEM VISIT.

## 2018-09-29 NOTE — NUR
PATIENT STILL RESTING QUIETLY ON HIS RIGHT SIDE ON 2L/NC. RESPIRATIONS EVEN
AND REGULAR. EYES CLOSED.

## 2018-09-29 NOTE — NUR
charge nurse rounding note: awake, sitting edge of bed. O2 3L NC, no c/o pain
or requets. tolerating diet and fluids well. call light at hands reach. f/c
patent.

## 2018-09-29 NOTE — NUR
pt is sitting up on side of bed drawing, call light in reach. pt did not need
anything at the moment

## 2018-09-29 NOTE — NUR
REPORT FROM ALANNA RICHARD. PT SITTING ON SIDE OF BED DRAWING. STATES HE SLEPT
FAIRLY WELL LAST NIGHT AND HAS NO CONCERNS THIS MORNING.

## 2018-09-29 NOTE — NUR
PATIENT IS NOW AWAKE SITTING UP ON THE EDGE OF THE BED LOOKING AT SOME PAPERS.
PATIENT SLEPT WELL MOST OF THE NIGHT ON HIS 2L/NC, BUT HIS LUNG SOUNDS STILL
SOUND VERY DEMINISHED. NO C/O PAIN THIS AM OR ANY OTHER COMPLAINTS DID ASK FOR
COFFEE AND CREAM AND SWEETNER.

## 2018-09-30 NOTE — NUR
PATIENT SLEPT WELL ALL NIGHT AFTER THE TYLENOL AT THE BEGINING OF THE SHIFT
TOOK HIS HA AWAY. BECERRA IS DRAINING WELL. PATIENT IS TAKING IN GOOD PO FLUIDS
AND HIS LUNGS ARE SOUNDING MUCH CLEARER EVEN THOUGH HE REMAINS ON 3L/NC.

## 2018-09-30 NOTE — NUR
PATIENT CONTINUES TO REST ON HIS RIGHT SIDE, ON 3L/NC, EYES CLOSED, BED RAILS
UP, CALL LIGHT IN REACH. RESPIRATIONS EVEN AND REGULAR.

## 2018-09-30 NOTE — NUR
PATIENT REFUSING TO SHOWER. PATIENT DRESSED IN CLEAN PANTS. BECERRA BAG CHANGED
BY RN EUNICE DUE TO LEAKING. NO OTHER NEEDS AT THIS TIME.

## 2018-09-30 NOTE — NUR
PATIENT STILL CONTINUES TO REST QUIETLY WITH EYES CLOSED, RESPIRATIONS EVEN
AND REGULAR, CALL LIGHT IN REACH,ON 2L/NC.

## 2018-09-30 NOTE — NUR
PATIENT STILL RESTING ON HIS RIGHT SIDE, RESPIRATIONS EVEN AND REGULAR EYES
CLOSED, PATIENT APPEARS IN NO DISTRESS. CALL LIGHT IN REACH.

## 2018-09-30 NOTE — NUR
PATIENT SITTING UP ON BEDSIDE. RESPIRATORY THERAPY IN ROOM. PATIENT GIVEN WARM
WASH CLOTH TO USE AFTER BREATHING TREATMENT. PATIENT STATES THERE ARE NO OTHER
NEEDS AT THIS TIME. CALL LIGHT IN REACH.

## 2018-09-30 NOTE — NUR
RN assumed care - pt dillon carroll in place draining well, no iv site. pt
c/o chronic back pain and lidocaine patch placed this am.  02 2.5L nc sob with
talking. call light in reach.

## 2018-10-05 ENCOUNTER — HOSPITAL ENCOUNTER (EMERGENCY)
Dept: HOSPITAL 46 - ED | Age: 78
End: 2018-10-05
Payer: MEDICARE

## 2018-10-05 VITALS — HEIGHT: 71 IN | WEIGHT: 154.1 LBS | BODY MASS INDEX: 21.57 KG/M2

## 2018-10-05 DIAGNOSIS — E11.9: ICD-10-CM

## 2018-10-05 DIAGNOSIS — J44.9: Primary | ICD-10-CM

## 2018-10-05 DIAGNOSIS — Z87.891: ICD-10-CM

## 2018-10-05 DIAGNOSIS — Z79.82: ICD-10-CM

## 2018-10-05 DIAGNOSIS — Z79.899: ICD-10-CM

## 2018-10-05 NOTE — XMS
PreManage Notification: [facility]O MRN:[id number]
 
Security Information
 
Security Events
No recent Security Events currently on file
 
 
 
CRITERIA MET
------------
- Group Notification
- [facility] - 2 Visits in 30 Days
 
 
CARE PROVIDERS
-------------------------------------------------------------------------------------
DARIO SERRANO     Internal Medicine: Pulmonary Disease     09/21/2018-Current
 
PHONE: [phone]
-------------------------------------------------------------------------------------
 
Michelle has no Care Guidelines for this patient.
Care History
Medical/Surgical
09/21/2018    [facility]
 
      - Patient is currently established with [facility]. If patient is seen 
      in the ED during business hours. Please contact CHWs at [facility].
      Care Recommendation: This patient has had 5 or more Emergency Department
      visits in the last 12 months.\T\nbsp; Patient requires education on the scope
      and purpose of the ED as an acute care provider not a Primary Care Provider
      and should not be utilized for chronic conditions.\T\nbsp; These are
      guidelines and the provider should exercise clinical judgment when providing
      care.
E.D. VISIT COUNT (12 MO.)
-------------------------------------------------------------------------------------
3 [facility]
-------------------------------------------------------------------------------------
TOTAL 3
-------------------------------------------------------------------------------------
NOTE: Visits indicate total known visits.
 
ED/UCC VISIT TRACKING (12 MO.)
-------------------------------------------------------------------------------------
10/05/2018 15:33
[facility] [place]
 
TYPE: Emergency
 
COMPLAINT:
- BP PROBLEM/DARK STOOL
-------------------------------------------------------------------------------------
09/20/2018 12:08
[facility] [place]
 
TYPE: Emergency
 
COMPLAINT:
 
- SOB
-------------------------------------------------------------------------------------
06/20/2018 13:07
[facility] [place]
 
TYPE: Emergency
 
COMPLAINT:
- DIFFICULTY BREATHING,ABD PAIN
 
DIAGNOSES:
- Long term (current) use of aspirin
- Chronic obstructive pulmonary disease, unspecified
- Unspecified abdominal pain
- LONG TERM (CURRENT) USE OF ORAL HYPOGLYCEMIC DRUGS
- Other long term (current) drug therapy
- Long term (current) use of opiate analgesic
- Shortness of breath
- Unspecified abdominal pain
- Heart failure, unspecified
- Chronic pain syndrome
- Long term (current) use of oral hypoglycemic drugs
- Personal history of nicotine dependence
- Type 2 diabetes mellitus without complications
-------------------------------------------------------------------------------------
 
 
INPATIENT VISIT TRACKING (12 MO.)
No inpatient visits to display in this time frame
 
[email or web address]

## 2018-11-02 ENCOUNTER — HOSPITAL ENCOUNTER (EMERGENCY)
Dept: HOSPITAL 46 - ED | Age: 78
Discharge: HOME | End: 2018-11-02
Payer: MEDICARE

## 2018-11-02 VITALS — WEIGHT: 155.01 LBS | BODY MASS INDEX: 21.7 KG/M2 | HEIGHT: 71 IN

## 2018-11-02 DIAGNOSIS — Z79.82: ICD-10-CM

## 2018-11-02 DIAGNOSIS — R03.1: ICD-10-CM

## 2018-11-02 DIAGNOSIS — I50.9: ICD-10-CM

## 2018-11-02 DIAGNOSIS — Z87.891: ICD-10-CM

## 2018-11-02 DIAGNOSIS — F03.90: ICD-10-CM

## 2018-11-02 DIAGNOSIS — J44.9: ICD-10-CM

## 2018-11-02 DIAGNOSIS — E11.9: ICD-10-CM

## 2018-11-02 DIAGNOSIS — Z79.899: ICD-10-CM

## 2018-11-02 DIAGNOSIS — R55: Primary | ICD-10-CM

## 2018-11-02 DIAGNOSIS — D64.9: ICD-10-CM

## 2018-11-02 NOTE — XMS
PreManage Notification: PARISH HURTADO MRN:[id number]
 
Security Information
 
Security Events
No recent Security Events currently on file
 
 
 
CRITERIA MET
------------
- [facility] - 2 Visits in 30 Days
 
 
CARE PROVIDERS
-------------------------------------------------------------------------------------
DARIO SERRANO     Internal Medicine: Pulmonary Disease     09/21/2018-Current
MD DAO
 
PHONE: Unknown
-------------------------------------------------------------------------------------
 
Michelle has no Care Guidelines for this patient.
Care History
Medical/Surgical
10/08/2018    [facility]
 
      - PATIENT IS CURRENTLY ON HOME HEALTH SERVICES. IF PATIENT IS SEEN IN THE ED 
      PLEASE CONTACT HOME HEALTH ON CALL RN, AND OR HOME HEALTH OFFICE DURING
      WORKING HOURS.
      - [phone]. IF NOT ABLE TO CONTACT AT THE NUMBER LISTED. PLEASE CALL [phone] AND REQUEST FOR THE ON CALL RN FOR HOME HEALTH.
09/21/2018    [facility]
 
      - Patient is currently established with [facility]. If patient is seen 
      in the ED during business hours. Please contact CHWs at [facility].
      Care Recommendation: This patient has had 5 or more Emergency Department
      visits in the last 12 months.\T\nbsp; Patient requires education on the scope
      and purpose of the ED as an acute care provider not a Primary Care Provider
      and should not be utilized for chronic conditions.\T\nbsp; These are
      guidelines and the provider should exercise clinical judgment when providing
      care.
E.D. VISIT COUNT (12 MO.)
-------------------------------------------------------------------------------------
5 [facility]
-------------------------------------------------------------------------------------
TOTAL 5
-------------------------------------------------------------------------------------
NOTE: Visits indicate total known visits.
 
ED/UCC VISIT TRACKING (12 MO.)
-------------------------------------------------------------------------------------
11/02/2018 11:56
[facility]
 
TYPE: Emergency
 
COMPLAINT:
- MEDICATION CHANGE REQUEST
 
-------------------------------------------------------------------------------------
10/17/2018 11:37
[facility]
 
TYPE: Emergency
 
COMPLAINT:
- PNEUMONIA
 
DIAGNOSES:
- Long term (current) use of aspirin
- Shortness of breath
- Chronic obstructive pulmonary disease, unspecified
- Other long term (current) drug therapy
- Type 2 diabetes mellitus without complications
- Heart failure, unspecified
- Personal history of nicotine dependence
- Long term (current) use of oral hypoglycemic drugs
-------------------------------------------------------------------------------------
10/05/2018 15:33
[facility] [place]
 
TYPE: Emergency
 
COMPLAINT:
- BP PROBLEM/DARK STOOL
 
DIAGNOSES:
- Other long term (current) drug therapy
- Personal history of nicotine dependence
- Long term (current) use of aspirin
- Nonspecific low blood-pressure reading
- Chronic obstructive pulmonary disease, unspecified
- Type 2 diabetes mellitus without complications
-------------------------------------------------------------------------------------
09/20/2018 12:08
[facility] [place]
 
TYPE: Emergency
 
COMPLAINT:
- SOB
-------------------------------------------------------------------------------------
06/20/2018 13:07
[facility]
 
TYPE: Emergency
 
COMPLAINT:
- DIFFICULTY BREATHING,ABD PAIN
 
DIAGNOSES:
- Long term (current) use of aspirin
- Chronic obstructive pulmonary disease, unspecified
- Unspecified abdominal pain
- LONG TERM (CURRENT) USE OF ORAL HYPOGLYCEMIC DRUGS
- Other long term (current) drug therapy
- Long term (current) use of opiate analgesic
- Shortness of breath
- Unspecified abdominal pain
 
- Heart failure, unspecified
- Chronic pain syndrome
- Long term (current) use of oral hypoglycemic drugs
- Personal history of nicotine dependence
- Type 2 diabetes mellitus without complications
-------------------------------------------------------------------------------------
 
 
INPATIENT VISIT TRACKING (12 MO.)
-------------------------------------------------------------------------------------
09/24/2018 12:15
[facility] [place]
 
TYPE: Medical Surgical
 
COMPLAINT:
- DECONDITIONING
 
DIAGNOSES:
- Unspecified mood [affective] disorder
- Paroxysmal atrial fibrillation
- Hyperlipidemia, unspecified
- Chronic obstructive pulmonary disease with acute lower respiratory infection
- Disease of pancreas, unspecified
- Dependence on supplemental oxygen
- Long term (current) use of inhaled steroids
- Dorsalgia, unspecified
- Other retention of urine
- Weakness
- Long term (current) use of aspirin
- Abnormal radiologic findings on diagnostic imaging of other urinary organs
- Chronic respiratory failure with hypoxia
- Other chronic pain
- Gastro-esophageal reflux disease without esophagitis
- Acquired absence of other specified parts of digestive tract
- Other long term (current) drug therapy
- Bladder-neck obstruction
- Benign prostatic hyperplasia with lower urinary tract symptoms
- Other specified diseases of liver
- Unsteadiness on feet
- Unspecified dementia with behavioral disturbance
- Cyst of kidney, acquired
- Pneumonia due to Streptococcus pneumoniae
- Unilateral inguinal hernia, without obstruction or gangrene, not specified as
recurrent
- Long term (current) use of oral hypoglycemic drugs
- Personal history of nicotine dependence
- Type 2 diabetes mellitus without complications
-------------------------------------------------------------------------------------
09/20/2018 17:08
[facility]
 
TYPE: Medical Surgical
 
COMPLAINT:
- PNEUMONIA
 
DIAGNOSES:
- Other specified diseases of liver
- Unspecified dementia with behavioral disturbance
 
- Chronic respiratory failure with hypoxia
- Personal history of nicotine dependence
- Dorsalgia, unspecified
- Diverticulum of bladder
- Chronic obstructive pulmonary disease, unspecified
- Long term (current) use of insulin
- Pneumonia, unspecified organism
- Disease of pancreas, unspecified
- Unilateral inguinal hernia, without obstruction or gangrene, not specified as
recurrent
- Type 2 diabetes mellitus without complications
- Unspecified atrial fibrillation
- Cyst of kidney, acquired
- Other retention of urine
- Other malaise
- Sepsis due to Streptococcus pneumoniae
- Benign prostatic hyperplasia with lower urinary tract symptoms
-------------------------------------------------------------------------------------
 
[email or web address]

## 2018-12-29 ENCOUNTER — HOSPITAL ENCOUNTER (INPATIENT)
Dept: HOSPITAL 46 - ED | Age: 78
LOS: 3 days | Discharge: HOME | DRG: 682 | End: 2019-01-01
Attending: INTERNAL MEDICINE | Admitting: INTERNAL MEDICINE
Payer: MEDICARE

## 2018-12-29 VITALS — HEIGHT: 71 IN | WEIGHT: 155.01 LBS | BODY MASS INDEX: 21.7 KG/M2

## 2018-12-29 DIAGNOSIS — R33.9: ICD-10-CM

## 2018-12-29 DIAGNOSIS — J44.9: ICD-10-CM

## 2018-12-29 DIAGNOSIS — E44.0: ICD-10-CM

## 2018-12-29 DIAGNOSIS — Z79.84: ICD-10-CM

## 2018-12-29 DIAGNOSIS — N39.0: ICD-10-CM

## 2018-12-29 DIAGNOSIS — T42.6X5A: ICD-10-CM

## 2018-12-29 DIAGNOSIS — B96.20: ICD-10-CM

## 2018-12-29 DIAGNOSIS — N17.9: Primary | ICD-10-CM

## 2018-12-29 DIAGNOSIS — G92: ICD-10-CM

## 2018-12-29 DIAGNOSIS — I11.0: ICD-10-CM

## 2018-12-29 DIAGNOSIS — F03.90: ICD-10-CM

## 2018-12-29 DIAGNOSIS — I50.9: ICD-10-CM

## 2018-12-29 DIAGNOSIS — I25.10: ICD-10-CM

## 2018-12-29 DIAGNOSIS — Z79.82: ICD-10-CM

## 2018-12-29 DIAGNOSIS — E11.9: ICD-10-CM

## 2018-12-29 PROCEDURE — G8978 MOBILITY CURRENT STATUS: HCPCS

## 2018-12-29 PROCEDURE — G8979 MOBILITY GOAL STATUS: HCPCS

## 2018-12-29 NOTE — NUR
PATIENT REPORT RECEIVED FROM VINCE RICHARD FROM ER, PATIENT TX VIA 1 PERSON
ASSIST FROM THE STRETCHER TO THE BED, HE IS UNSTEADY ON HIS FEET AND REQUIRES
ASSISTANCE.  HE IS ON OXYGEN AT 4L PER NC AND PULSE OXIMETER PLACED ON HIM AT
THIS TIME.  BED IN THE LOW POSITION AND RAILS UP WITH ALARM ON.  PATIENT GIVEN
COLORING ITEMS TO KEEP HIM OCCUPIED AND DINNER ORDER PLACED.

## 2018-12-29 NOTE — NUR
PATIENT ONE PERSON ASSIST UP TO THE BATHROOM TO HAVE A BM, HE CALLED
APPROPRIATLY BUT REMAIN UNSTEADY ON HIS FEET AND IS CONFUSED ABOUT WHAT THINGS
AROUND THE ROOM ARE FOR LIKE THE PULSE OXIMETER AND THE OXYGEN TUBING.
PATIENT NEEDS TO HAVE ALARM ON HIM AT ALL TIMES AND RAILS UP

## 2018-12-29 NOTE — EKG
[facility]
                                    [address]
                                  [place]  [address]
_________________________________________________________________________________________
                                                                 Signed   
 
 
Sinus bradycardia with premature atrial complexes
Otherwise normal ECG
When compared with ECG of 17-OCT-2018 11:41,
premature atrial complexes are now present
Vent. rate has decreased BY  41 BPM
ST elevation now present in Inferior leads
T wave inversion no longer evident in Inferior leads
Confirmed by TALISHA HENDERSON MD ([id number]) on 12/29/2018 3:45:38 PM
 
 
 
 
 
 
 
 
 
 
 
 
 
 
 
 
 
 
 
 
 
 
 
 
 
 
 
 
 
 
 
 
 
 
 
 
 
    Electronically Signed By: TALISHA HENDERSON MD  12/29/18 1545
_________________________________________________________________________________________
PATIENT NAME:     JORGEBEREPARISH MORRIS            
MEDICAL RECORD #: [id number]                     Electrocardiogram             
          ACCT #: [id number]  
DATE OF BIRTH:   [birth year]                                       
PHYSICIAN:   TALISHA HENDERSON MD                   REPORT #: [id number]
REPORT IS CONFIDENTIAL AND NOT TO BE RELEASED WITHOUT AUTHORIZATION

## 2018-12-29 NOTE — NUR
PATIENT JUST RESTING IN BED WATCHING TV, REMAINS ON 4L/NC, AND VS ARE STABLE
WITH THIS. ASSESSMENT COMPLETE AND PATIENT HAS HAD HIS PM MEDS.

## 2018-12-29 NOTE — NUR
PATIENT RESTING QUIETLY IN BED. BED ALARM ON. PATIENT ONLY ORIENTED TO SELD
NEEDS FREQUENT REORIENTATION. VISUAL AND AUDITORY HALLCINATIONS OF A TALL ARNETT
HAIRED MAN WITH A BANDANA TIED OVER THE LOWER PART OF HIS FACE COMING INTO HIS
ROOM AND THAT HE REAN HIM OUT CAUSE HE LOOKED LIKE TROUBLE AND HE WAS WORRIED
FOR THE GIRLS. PATIENT JUST WATCHING TV NOW. GIVEN A CUP OF DECAFE COFFEE.
DENIES PAIN.

## 2018-12-30 NOTE — NUR
PT TAKING NAP IN BED, RESP EVEN AND NON LABORED. PT APPEARS COMFORTABLE AND
WITHOUT NOTABLE DISTRESS. PERSONAL SUPPLIES AND CALL LIGHT WITHIN REACH OF PT.

## 2018-12-30 NOTE — NUR
PATIENT RESTING QUIETLY, EYESCLOSED, SUPINE, REMAINS ON 4L/NC, VS HAVE BEEN
STABLE, RESPIRATIONS EVEN AND REGULAR AT 18BPM.

## 2018-12-30 NOTE — NUR
PT ASSESSMENT COMPLETE. SPO2 95% ON 2L OXYGEN BY NC. EXP WHEEZE HEARD RUQ. PT
SITTING UP AT SIDE OF BED. ICE WATER AND DECAF COFFEE PROVIDED. CNA APRIL IN
ROOM FOR VITALS. CALL LIGHT IN REACH. BED ALARM ON.

## 2018-12-30 NOTE — NUR
ROUNDED AS CHARGE. PATIENT IS SITTING ON THE EDGE OF THE BED. PATIENTS ALARM
IS ON FOR SAFETY. PATIENT DENIES ANY COMMENTS, QUESTIONS, OR CONCERNS. NO
NEEDS NOTED. CALL LIGHT IN REACH.

## 2018-12-30 NOTE — NUR
VS TAKEN AND RECORDED, FRESH WATER IS AT BEDSIDE. PT DENIES NEEDS. BED ALARM
IS ON AND CALL LIGHT IS WITHIN REACH.

## 2018-12-30 NOTE — NUR
BEDSIDE REPORT RECEIVED FROM HILARY STEVENS. PT RESTING IN BED. IVF INFUSING WNL.
PT RATES PAIN 6/10 IN BACK AND HA. STATES "ALWAYS UP AND DOWN". SPO2 97% ON 3L
OXYGEN BY NC. CALL LIGHT IN REACH. BED ALARM ON.

## 2018-12-30 NOTE — NUR
VITALS AND I&OS DONE AND CHARTED. BEDSIDE TABLE AND CALL LIGHT GIVEN. SAND BOX
GIVEN PER PT REQUEST.

## 2018-12-30 NOTE — NUR
PATIENT REMAINS PLEASANTLY CONFUSED, HAS SLEPT ON AND OFF THROUGH THE NIGHT.
HAS BEEN VOIDING PER URINAL SITTING AT BEDSIDE. IV INFUSING NS AT 75MLS/HR AND
WNL.  PATIENT HAS DENIED PAIN ALL NIGHT. SATS 99% ON 4L/NC AND HR 68. PATIENT
LAYING BACK DOWN TO WAIT FOR BREAKFAST.

## 2018-12-30 NOTE — NUR
PT PLEASANTLY CONFUSED. PT ON 4L OXYGEN. NS @75ML/HR. TYLENOL PRN HEADACHE. PT
TOLERATING DIET. 1PA. BED ALARM INTACT.

## 2018-12-30 NOTE — NUR
CHECKED ON PT. RESTING IN BED, AWAKE. SPO2 95% ON 2L OXYGEN BY NC. LIGHTS
TURNED DOWN IN ROOM PER PT REQUEST. CALL LIGHT IN REACH.

## 2018-12-30 NOTE — NUR
PT CONTINUING TO TAKE A NAP, RESP EVEN AND NON LABORED. PT APPEARS
COMFORTABLE. PERSONAL SUPPLIES AND CALL LIGHT WITHIN REACH. NO NEEDS AT THIS
TIME.

## 2018-12-30 NOTE — NUR
VITALS AND I&OS DONE AND CHARTED. BEDSIDE TABLE AND CALL LIGHT IN REACH. BED
ALARM ON. PT NEEDS NOTHING AT THIS TIME.

## 2018-12-31 NOTE — NUR
PATIENT RESTING IN BED. OCUPATIONAL THERAPIST IN ROOM. VITAL SIGNS AND I&O
DONE. CALL LIGHT WITHIN REACH. BED ALARM ON. NO OTHER NEEDS AT THIS TIME

## 2018-12-31 NOTE — NUR
BEDSIDE REPORT RECEIVED FROM HILARY CORREA. PT RESTING IN BED AWAKE.
SPO2 96% ON 2L OXYGEN BY NC, HR 68. NO REQUESTS AT THIS TIME. BED ALARM SET.
URINAL EMPTIED.

## 2018-12-31 NOTE — NUR
BED ALARM YOANA FERNANDEZ APRIL IN ROOM TO ASSIST PT TO STAND AT BEDSIDE FOR
VOID IN URINAL. PT BACK IN BED, SPO2 WNL ON 2L OXYGEN BY NC.

## 2018-12-31 NOTE — NUR
BED ALARM SOUNDING, PT ASSISTED TO STAND AT BEDSIDE TO USE URINAL, 175 ML
CLEAR YELLOW URINE VOIDED. PT BACK IN BED. BED ALARM ON. CALL LIGHT IN REACH.

## 2018-12-31 NOTE — NUR
DR. HENDERSON WAS IN TO SEE PT, BP AT THAT TIME REASSESSED, 174/81(91), PULSE 80
RADIAL.  PT STILL C/O SOME RIGHT SIDED CHEST PAIN, SHARP, WORSENED WITH
BREATHING AND WITH PALPATION.  ECG SHOWED SINUS RHYTHM WITH SINUS ARRHYTHMIA
WITH OCCASIONAL PVCs, OTHERWISE NORMAL ECG, VENT RATE 70 BPM.  DR. HENDERSON
REVIEWED ECG.  WILL CONTINUE TO MONITOR.

## 2018-12-31 NOTE — NUR
RECIEVED BEDSIDE REPORT FROM HILARY CHAN.  PT IN BED, AWAKE, ALERT.  PERSONAL
SUPPLIES AND CALL LIGHT IN REACH.

## 2018-12-31 NOTE — NUR
PATIENT RESTING IN BED. VITAL SIGNS AND I&O DONE. CALL LIGHT WITHIN REACH. BED
ALARM ON. NO OTHER NEEDS AT THIS TIME

## 2018-12-31 NOTE — NUR
PT IN BED, SLEEPING SOUNDLY, AROUSED TO VOICE AND GENTLE TOUCH TO SHOULDER.
PT ALERT, ORIENTED TO SELF AND SURROUNDINGS.  KNOWS THAT HE IS IN A HOSPITAL,
BUT NOT AWARE OF WHICH ONE.  DENIED PAIN.  GAVE SCHEDULED MEDICATION.  PT HAS
PERSONAL SUPPLIES AND CALL LIGHT IN REACH.  PT ON 2L O2 VIA NC, OXYGEN
SATURATION LEVEL PER CONTINUOUS PULSE OX 95%.  BED ALARM ON.

## 2018-12-31 NOTE — NUR
PT ASSESSMENT COMPLETE. PRN TYLENOL ADMINISTERED FOR SNIDER, RIB PAIN. PT RESTING
IN BED. LUNGS CLEAR THROUGHOUT ALL LOBES, SPO2 WNL ON CONT. PULSE OX WITH 2L
OXYGEN BY NC. ORIENTED TO SELF, , LOCATION (HOSPITAL).
BED ALARM ON. CALL LIGHT NEXT TO PT.

## 2018-12-31 NOTE — NUR
1 PA SBA TO THE BATHROOM USING CANE WITH O2 ON VIA NC. PATIENT IS BACK IN BED.
RT WAS IN THE ROOM WITH PATIENT. BED ALARM ON.

## 2018-12-31 NOTE — NUR
PT C/O RIGHT SIDED CHEST PAIN, WORSENED WITH DEEP BREATHS, DENIED SHORTNESS OF
BREATH.  /80, HR 90.  NOTIFIED DR. HENDERSON VIA TELEPHONE, RECIEVED TORB
FOR STAT 12 LEAD EKG AND TO GIVEN MORPHINE 1 MG IV X 1.  RT HALLE IN ROOM
DOING EKG, MORPHINE 1 MG IV GIVEN NOW.

## 2018-12-31 NOTE — NUR
PT % OF HIS BREAKFAST, IS NOW DRINKING COFFEE.  REPORTS THAT PAIN TO
RIGHT SIDE OF CHEST HAS IMPROVED, RATES PAIN 5/10 NOW.  PERSONAL SUPPLIES AND
CALL LIGHT IN REACH.  BED ALARM ON.

## 2018-12-31 NOTE — NUR
BED ALARM SOUNDING, PT ASSISTED TO USE URINAL AT BEDSIDE. ASSESSMENT COMPLETE.
PT RATES PAIN 5/10 IN STOMACH AND HEAD, PRN PAIN MEDICATION ADMINISTERED. IVF
INFUSING WNL. PT HAS CALL LIGHT IN REACH. BED ALARM ON.

## 2018-12-31 NOTE — NUR
PT RESTING IN BED, EYES OPEN, ALERT.  DENIES NEEDS.  BED ALARM ON.  PERSONAL
SUPPLIES AND CALL LIGHT IN REACH.

## 2018-12-31 NOTE — NUR
PT ON 2L O2 VIA NC THROUGHOUT SHIFT, MAINTAINED O2 SATS AT OR GREATER THAN
90%.  PT REMAINS CONFUSED, DISORIENTED TO ALL EXCEPT SELF, DATE OF BIRTH, AND
SUROUNDINGS.  FOLLOWS COMMANDS, BUT DOES NOT USE CALL LIGHT, SO BED ALARM IN
USE THROUGHOUT SHIFT.  PT UP WITH 1 PERSON STANDBY ASSIST.  PT ON ADA DIET,
TOLERATING WELL.  C/O RIGHT SIDED CHEST PAIN, WORSENED WITH DEEP BREATHS AND
WITH PALPATION, ASSESSED BY DR. HENDERSON, TRIPONIN T NEGATIVE, ECG SINUS RHYTHM
WITH OCCASIONAL PVCs, RATE 70.  PT DID RECIEVE MORPHINE 1 MG IV X 1 AT THE
TIME OF THIS EVENT.  NO FURTHER INTERVENTIONS REQUIRED.  URINE OUTPUT QUANTITY
SUFFICIENT, AND PT HAD A BM THIS SHIFT.

## 2018-12-31 NOTE — NUR
VITALS AND I&OS DONE AND CHARTED. INFORMED HILARY CHAN OF HIGH BLOOD PRESSURE.
PT ASKED FOR MEDS FOR HIS HEADACHE. I INFORMED HILARY CHAN. I OFFERED HIM A COLD
RAG FOR THE BACK OF HIS NECK, HE ACCEPTED. EMPTIED GARBAGES AND CLEANED UP
ROOM. PT NEEDS NOTHING MORE AT THIS TIME.

## 2018-12-31 NOTE — NUR
PT SITTING UP AT EDGE OF BED, % OF DINNER.  BED ALARM ON.  REMINDED PT
TO USE CALL LIGHT TO CALL FOR ASSIST.

## 2018-12-31 NOTE — NUR
PT IN BED, GAVE BREAKFAST ORDER TO YOANA MORRIS.  PROVIDED PT WITH FRESH ICE
WATER.  PT DENIED OTHER NEEDS AT THIS TIME.  PERSONAL SUPPLIES AND CALL LIGHT
IN REACH.

## 2018-12-31 NOTE — NUR
PATIENT ASSESSMENT DONE TODAY, WILL FOLLOW UP WITH PATIENT AND DAUGHTER AS HE
BECOMES MORE ORIENTED AND STRONGER TO SEE WHAT D/C NEEDS HE HAS

## 2018-12-31 NOTE — NUR
ROUNDED ON PATIENT SITTING ON SIDE OF BED, COLORING. PATIENT C/O HEADACHE AS A
"6/10", COOL CLOTH APPLIED TO NECK BY CNA. BED ALARM ON. CALL LIGHT WITHIN
REACH. POSSESSIONS AT BEDSIDE. NO MORE NEEDS AT THIS TIME.

## 2018-12-31 NOTE — NUR
PT ORIENTED TO PERSON AND . 1PA TO RESTROOM FOR BM THIS SHIFT, GAIT STEADY.
IMPULSIVE THROUGHOUT SHIFT, BED ALARM IN PLACE. QS VOIDS IN URINAL AT BEDSIDE.
LUNGS CLEAR THROUGHOUT ALL LOBES, SPO2 WNL ON CONT. PULSE OX THROUGHOUT SHIFT.
OXYGEN TITRATED DOWN TO 2L BY NC. PRN TYLENOL X 2 FOR PAIN CONTROL. IVF
INFUSING WNL.

## 2018-12-31 NOTE — NUR
PT SITTING UP AT EDGE OF BED, BED ALARM ON, CURTAIN AND DOOR OPEN, AND PT
REMINDED TO CALL FOR ASSISTANCE.  PT ON 2L O2 VIA NC, OXYGEN SATURATION LEVEL
90%.  PT % OF LUNCH.  PERSONAL SUPPLIES AND CALL LIGHT IN REACH.  PT
DENIED ANY CHEST PAIN.

## 2018-12-31 NOTE — NUR
BED ALARM SOUNDING, SBA AT BEDSIDE FOR PT TO VOID IN URINAL AND BACK TO BED.
BED ALARM ON. PT INSTRUCTED TO USE CALL LIGHT BEFORE GETTING OUT OF BED,
VERBALIZES UNDERSTANDING.

## 2019-01-01 NOTE — NUR
PT RESTED WELL THIS SHIFT. IMPULSIVE, OUT OF BED FOR VOIDS AT BEDSIDE, BED
ALARM ON THROUGHOUT SHIFT. ORIENTED TO PERSON, , HOSPITAL. BM THIS SHIFT.
SBA W CANE, GAIT STEADY. PRN TYLENOL X 1 FOR HA, AND RIGHT RIB PAIN. SPO2 WNL
ON 2L OXYGEN BY NC, CONT. PULSE OX. IV SALINE LOCKED. POSSIBLE D/C HOME TODAY.

## 2019-01-01 NOTE — NUR
PT ASSESSMENT COMPLETE. PT C/O 5.5/10 ABD PAIN AND 6/10 HA "STATES FEELS LIKE
ROCKS ROLLING IN HEAD, AXE SPLITTING HA". BOWEL TONES ACTIVE X 4. ABD TENDER W
PALPATION, SOFT. PRN PAIN MEDICATION ADMINISTERED. BED ALARM ON. CALL LIGHT IN
REACH.

## 2019-01-01 NOTE — EKG
[facility]
                                    [address]
                                  [place]  [address]
_________________________________________________________________________________________
                                                                 Signed   
 
 
Sinus rhythm with sinus arrhythmia with occasional premature ventricular complexes
Otherwise normal ECG
No previous ECGs available
Confirmed by TALISHA HENDERSON MD ([id number]) on 1/1/2019 7:51:32 AM
 
 
 
 
 
 
 
 
 
 
 
 
 
 
 
 
 
 
 
 
 
 
 
 
 
 
 
 
 
 
 
 
 
 
 
 
 
 
 
 
 
    Electronically Signed By: TALISHA HENDERSON MD  01/01/19 0751
_________________________________________________________________________________________
PATIENT NAME:     PARISH HURTADO            
MEDICAL RECORD #: [id number]                     Electrocardiogram             
          ACCT #: [id number]  
DATE OF BIRTH:   [birth year]                                       
PHYSICIAN:   TALISHA HENDERSON MD                   REPORT #: [id number]
REPORT IS CONFIDENTIAL AND NOT TO BE RELEASED WITHOUT AUTHORIZATION

## 2019-01-01 NOTE — NUR
CHECKED ON PT, RESTING IN BED WITH EYES CLOSED. BREATHING EQUAL AND
NON-LABORED ON 2L OXYGEN BY NC. BED ALARM ON.

## 2019-01-01 NOTE — NUR
PT UP TO BATHROOM, VOIDED URINE IN TOILET, UNMEASURED.  THEN BACK TO BED USING
CANE, STANDBY ASSIST.  PT NOW SITTING UP EATING BREAKFAST.  PERSONAL SUPPLIES
AND CALL LIGHT IN REACH, BED ALARM ON.

## 2019-01-01 NOTE — NUR
RECIEVED BEDSIDE REPORT FROM HILARY CHAN.  PT AWAKE, ALERT, ORIENTED TO MONTH
AND DAY, SELF, DATE OF BIRTH, AND SURROUNDINGS.  PT ON 2L O2 VIA NC, OXYGEN
SATURATION LEVEL 92% PER CONTINUOUS PULSE OX.  PERSONAL SUPPLIES AND CALL
LIGHT IN REACH, BED ALARM ON.

## 2019-02-17 ENCOUNTER — HOSPITAL ENCOUNTER (EMERGENCY)
Dept: HOSPITAL 46 - ED | Age: 79
Discharge: HOME | End: 2019-02-17
Payer: MEDICARE

## 2019-02-17 VITALS — WEIGHT: 155.01 LBS | HEIGHT: 71 IN | BODY MASS INDEX: 21.7 KG/M2

## 2019-02-17 DIAGNOSIS — I50.9: ICD-10-CM

## 2019-02-17 DIAGNOSIS — Z88.8: ICD-10-CM

## 2019-02-17 DIAGNOSIS — R10.84: Primary | ICD-10-CM

## 2019-02-17 DIAGNOSIS — Z79.899: ICD-10-CM

## 2019-02-17 DIAGNOSIS — F03.90: ICD-10-CM

## 2019-02-17 DIAGNOSIS — Z79.82: ICD-10-CM

## 2019-02-17 DIAGNOSIS — J44.9: ICD-10-CM

## 2019-02-17 DIAGNOSIS — Z79.84: ICD-10-CM

## 2019-02-17 DIAGNOSIS — E11.9: ICD-10-CM

## 2019-02-17 NOTE — XMS
PreManage Notification: PARISH HURTADO MRN:[id number]
 
Security Information
 
Security Events
No recent Security Events currently on file
 
 
 
CRITERIA MET
------------
- 6 ED Visits in 6 Months
- [facility] - Has Care Guidelines
 
 
CARE PROVIDERS
-------------------------------------------------------------------------------------
Anirudh Sims     Internal Medicine: Pulmonary Disease     09/21/2018-Current
 
PHONE: Unknown
-------------------------------------------------------------------------------------
Marixa Briones     /Care Coordinator     01/03/2019-Current
 
PHONE: [phone]
-------------------------------------------------------------------------------------
Marixa Briones     Primary Care     01/03/2019-Current
 
PHONE: [phone]
-------------------------------------------------------------------------------------
 
Michelle has no Care Guidelines for this patient.
Care History
Medical/Surgical
12/19/2018    [facility]
 
      - PATIENT WAS TRANSFERRED TO [facility]. PLEASE 
      CONTACT [facility] IF PATIENT IS SEEN IN THE ED- [phone].
      - PATIENT HAS AN APT WITH PCP ON 12/30/18 @ 11:00AM.
12/05/2018    [facility]
 
      - [facility] OF [facility] AND THE HOSPITAL ARE NOT ABLE TO WORK WITH PATIENT.
      
      - DUE TO MULTIPLE APD REFERRALS MADE ON PATIENT- PATIENT DAUGHTER IS NO LONGER 
      ALLOWING HELP/SERVICES FROM THE CLINIC AND [facility].
      - IF PATIENT IS SEEN IN THE ED PLEASE CONTACT [facility] IF THERE IS ANY CONCERNS 
      WITH NEGLECT AND FOLLOW UP CARE TO MEDICAL NEEDS.
10/08/2018    [facility]
 
      - PATIENT IS CURRENTLY ON HOME HEALTH SERVICES. IF PATIENT IS SEEN IN THE ED 
      PLEASE CONTACT HOME HEALTH ON CALL RN, AND OR HOME HEALTH OFFICE DURING
      WORKING HOURS.
      - [phone]. IF NOT ABLE TO CONTACT AT THE NUMBER LISTED. PLEASE CALL [phone] AND REQUEST FOR THE ON CALL RN FOR HOME HEALTH.
CASSI. VISIT COUNT (12 MO.)
-------------------------------------------------------------------------------------
7 [facility]
-------------------------------------------------------------------------------------
TOTAL 7
-------------------------------------------------------------------------------------
 
NOTE: Visits indicate total known visits.
 
ED/C VISIT TRACKING (12 MO.)
-------------------------------------------------------------------------------------
02/17/2019 16:14
[facility] [place]
 
TYPE: Emergency
 
COMPLAINT:
- ABD PAIN
-------------------------------------------------------------------------------------
12/29/2018 10:44
[facility] [place]
 
TYPE: Emergency
 
COMPLAINT:
- SOB/WEAKNESS
-------------------------------------------------------------------------------------
11/02/2018 11:56
[facility] [place]
 
TYPE: Emergency
 
COMPLAINT:
- MEDICATION REFILL
 
DIAGNOSES:
- Nonspecific low blood-pressure reading
- Long term (current) use of aspirin
- Heart failure, unspecified
- Unspecified dementia without behavioral disturbance
- Syncope and collapse
- Personal history of nicotine dependence
- Anemia, unspecified
- Encounter for issue of repeat prescription
- Chronic obstructive pulmonary disease, unspecified
- Type 2 diabetes mellitus without complications
- Other long term (current) drug therapy
-------------------------------------------------------------------------------------
10/17/2018 11:37
[facility] [place]
 
TYPE: Emergency
 
COMPLAINT:
- PNEUMONIA
 
DIAGNOSES:
- Long term (current) use of aspirin
- Shortness of breath
- Chronic obstructive pulmonary disease, unspecified
- Other long term (current) drug therapy
- Type 2 diabetes mellitus without complications
- Heart failure, unspecified
- Personal history of nicotine dependence
- Long term (current) use of oral hypoglycemic drugs
-------------------------------------------------------------------------------------
10/05/2018 15:33
 
[facility] [place]
 
TYPE: Emergency
 
COMPLAINT:
- BP PROBLEM/DARK STOOL
 
DIAGNOSES:
- Other long term (current) drug therapy
- Personal history of nicotine dependence
- Long term (current) use of aspirin
- Nonspecific low blood-pressure reading
- Chronic obstructive pulmonary disease, unspecified
- Type 2 diabetes mellitus without complications
-------------------------------------------------------------------------------------
09/20/2018 12:08
[facility] [place]
 
TYPE: Emergency
 
COMPLAINT:
- SOB
-------------------------------------------------------------------------------------
06/20/2018 13:07
[facility] [place]
 
TYPE: Emergency
 
COMPLAINT:
- DIFFICULTY BREATHING,ABD PAIN
 
DIAGNOSES:
- Long term (current) use of aspirin
- Chronic obstructive pulmonary disease, unspecified
- Unspecified abdominal pain
- LONG TERM (CURRENT) USE OF ORAL HYPOGLYCEMIC DRUGS
- Other long term (current) drug therapy
- Long term (current) use of opiate analgesic
- Shortness of breath
- Unspecified abdominal pain
- Heart failure, unspecified
- Chronic pain syndrome
- Long term (current) use of oral hypoglycemic drugs
- Personal history of nicotine dependence
- Type 2 diabetes mellitus without complications
-------------------------------------------------------------------------------------
 
 
INPATIENT VISIT TRACKING (12 MO.)
-------------------------------------------------------------------------------------
12/29/2018 15:46
[facility] [place]
 
TYPE: Medical Surgical
 
COMPLAINT:
- METABOLIC ENCEPHALOPATHY
 
DIAGNOSES:
- Retention of urine, unspecified
 
- Unspecified dementia without behavioral disturbance
- Acute kidney failure, unspecified
- Hypertensive heart disease with heart failure
- Unspecified Escherichia coli [E. coli] as the cause of diseases classified
elsewhere
- Urinary tract infection, site not specified
- Chronic obstructive pulmonary disease, unspecified
- Adverse effect of other antiepileptic and sedative-hypnotic drugs, initial
encounter
- Heart failure, unspecified
- Long term (current) use of oral hypoglycemic drugs
- Type 2 diabetes mellitus without complications
- Moderate protein-calorie malnutrition
- Toxic encephalopathy
- Long term (current) use of aspirin
- Atherosclerotic heart disease of native coronary artery without angina pectoris
-------------------------------------------------------------------------------------
09/24/2018 12:15
[facility] [place]
 
TYPE: Medical Surgical
 
COMPLAINT:
- DECONDITIONING
 
DIAGNOSES:
- Unspecified mood [affective] disorder
- Paroxysmal atrial fibrillation
- Hyperlipidemia, unspecified
- Chronic obstructive pulmonary disease with acute lower respiratory infection
- Disease of pancreas, unspecified
- Dependence on supplemental oxygen
- Long term (current) use of inhaled steroids
- Dorsalgia, unspecified
- Other retention of urine
- Weakness
- Long term (current) use of aspirin
- Abnormal radiologic findings on diagnostic imaging of other urinary organs
- Chronic respiratory failure with hypoxia
- Other chronic pain
- Gastro-esophageal reflux disease without esophagitis
- Acquired absence of other specified parts of digestive tract
- Other long term (current) drug therapy
- Bladder-neck obstruction
- Benign prostatic hyperplasia with lower urinary tract symptoms
- Other specified diseases of liver
- Unsteadiness on feet
- Unspecified dementia with behavioral disturbance
- Cyst of kidney, acquired
- Pneumonia due to Streptococcus pneumoniae
- Unilateral inguinal hernia, without obstruction or gangrene, not specified as
recurrent
- Long term (current) use of oral hypoglycemic drugs
- Personal history of nicotine dependence
- Type 2 diabetes mellitus without complications
-------------------------------------------------------------------------------------
09/20/2018 17:08
[facility] [place]
 
TYPE: Medical Surgical
 
 
COMPLAINT:
- PNEUMONIA
 
DIAGNOSES:
- Other specified diseases of liver
- Unspecified dementia with behavioral disturbance
- Chronic respiratory failure with hypoxia
- Personal history of nicotine dependence
- Dorsalgia, unspecified
- Diverticulum of bladder
- Chronic obstructive pulmonary disease, unspecified
- Long term (current) use of insulin
- Pneumonia, unspecified organism
- Disease of pancreas, unspecified
- Unilateral inguinal hernia, without obstruction or gangrene, not specified as
recurrent
- Type 2 diabetes mellitus without complications
- Unspecified atrial fibrillation
- Cyst of kidney, acquired
- Other retention of urine
- Other malaise
- Sepsis due to Streptococcus pneumoniae
- Benign prostatic hyperplasia with lower urinary tract symptoms
-------------------------------------------------------------------------------------
 
[email or web address]

## 2019-02-26 ENCOUNTER — HOSPITAL ENCOUNTER (OUTPATIENT)
Dept: HOSPITAL 46 - ED | Age: 79
Setting detail: OBSERVATION
LOS: 2 days | Discharge: HOME | End: 2019-02-28
Attending: INTERNAL MEDICINE | Admitting: INTERNAL MEDICINE
Payer: MEDICARE

## 2019-02-26 VITALS — WEIGHT: 146.5 LBS | BODY MASS INDEX: 20.51 KG/M2 | HEIGHT: 71 IN

## 2019-02-26 DIAGNOSIS — Z79.51: ICD-10-CM

## 2019-02-26 DIAGNOSIS — Z99.81: ICD-10-CM

## 2019-02-26 DIAGNOSIS — E86.0: Primary | ICD-10-CM

## 2019-02-26 DIAGNOSIS — J96.11: ICD-10-CM

## 2019-02-26 DIAGNOSIS — J44.9: ICD-10-CM

## 2019-02-26 DIAGNOSIS — I48.0: ICD-10-CM

## 2019-02-26 DIAGNOSIS — E78.5: ICD-10-CM

## 2019-02-26 DIAGNOSIS — K21.9: ICD-10-CM

## 2019-02-26 DIAGNOSIS — N40.0: ICD-10-CM

## 2019-02-26 DIAGNOSIS — G93.41: ICD-10-CM

## 2019-02-26 DIAGNOSIS — M48.00: ICD-10-CM

## 2019-02-26 DIAGNOSIS — F03.91: ICD-10-CM

## 2019-02-26 DIAGNOSIS — Z79.82: ICD-10-CM

## 2019-02-26 DIAGNOSIS — G47.00: ICD-10-CM

## 2019-02-26 DIAGNOSIS — Z79.899: ICD-10-CM

## 2019-02-26 DIAGNOSIS — R10.9: ICD-10-CM

## 2019-02-26 DIAGNOSIS — Z88.8: ICD-10-CM

## 2019-02-26 DIAGNOSIS — G89.29: ICD-10-CM

## 2019-02-26 DIAGNOSIS — F32.9: ICD-10-CM

## 2019-02-26 PROCEDURE — G0378 HOSPITAL OBSERVATION PER HR: HCPCS

## 2019-02-26 NOTE — NUR
PUMP ALARMING, 1000ML BOLUS COMPLETE. 2ND BOLUS STARTED (SEE MAR). PT RESTING
IN BED. BED RAILS UP. CALL LIGHT WITHIN REACH. BED ALARM ON.

## 2019-02-26 NOTE — EKG
[facility] [place] [facility]
                                    [address]
                                  [place]  [address]
_________________________________________________________________________________________
                                                                 Signed   
 
 
Atrial fibrillation with premature ventricular or aberrantly conducted complexes
Abnormal ECG
When compared with ECG of 31-DEC-2018 07:55,
Atrial fibrillation has replaced Sinus rhythm
Confirmed by BAILEY LY MD ([id number]) on 2/26/2019 7:59:50 PM
 
 
 
 
 
 
 
 
 
 
 
 
 
 
 
 
 
 
 
 
 
 
 
 
 
 
 
 
 
 
 
 
 
 
 
 
 
 
 
 
    Electronically Signed By: BAILEY LY MD  02/26/19 2000
_________________________________________________________________________________________
PATIENT NAME:     PARISH HURTADO            
MEDICAL RECORD #: [id number]                     Electrocardiogram             
          ACCT #: [id number]  
DATE OF BIRTH:   [birth year]                                       
PHYSICIAN:   BAILEY LY MD           REPORT #: [id number]
REPORT IS CONFIDENTIAL AND NOT TO BE RELEASED WITHOUT AUTHORIZATION

## 2019-02-26 NOTE — NUR
PT ARRIVED TODAY FROM ED FOR METABOLIC ENCEPHALOPATHY. PT ALERT AND ORIENTED
TO ALL BUT YEAR. OCCATIONAL SLURRED SPEACH.
7/10 PAIN IN LOWER BACK AND LEFT HIP, PRN PAIN MEDICATION GIVEN X1. PT ON 4L
O2 BY NC, CHONIC AT HOME AS WELL. NEBULIZER GIVEN. 1500 IV BOLUS RUNNING, IV
FLUIDS TO FOLLOW. DEPENDS IN PLACE, PT USING URINAL. STRAIGHT CATH THIS SHIFT,
UA SENT TO LAB. BED ALARM ON. PT USING CALL LIGHT APPROPRIATLY.

## 2019-02-26 NOTE — XMS
PreManage Notification: PARISH HURTADO MRN:[id number]
 
Security Information
 
Security Events
No recent Security Events currently on file
 
 
 
CRITERIA MET
------------
- 6 ED Visits in 6 Months
- [facility] - Has Care Guidelines
- [facility] - 2 Visits in 30 Days
 
 
CARE PROVIDERS
-------------------------------------------------------------------------------------
Anirudh Sims     Internal Medicine: Pulmonary Disease     09/21/2018-Current
 
PHONE: Unknown
-------------------------------------------------------------------------------------
Marixa Briones     /Care Coordinator     01/03/2019-Current
 
PHONE: [phone]
-------------------------------------------------------------------------------------
Marixa Briones     Primary Care     01/03/2019-Current
 
PHONE: [phone]
-------------------------------------------------------------------------------------
 
Michelle has no Care Guidelines for this patient.
Care History
Medical/Surgical
12/19/2018    [facility]
 
      - PATIENT WAS TRANSFERRED TO [facility]. PLEASE 
      CONTACT [facility] IF PATIENT IS SEEN IN THE ED- [phone].
      - PATIENT HAS AN APT WITH PCP ON 12/30/18 @ 11:00AM.
12/05/2018    [facility]
 
      - [facility] AND THE HOSPITAL ARE NOT ABLE TO WORK WITH PATIENT.
      
      - DUE TO MULTIPLE APD REFERRALS MADE ON PATIENT- PATIENT DAUGHTER IS NO LONGER 
      ALLOWING HELP/SERVICES FROM THE CLINIC AND [facility].
      - IF PATIENT IS SEEN IN THE ED PLEASE CONTACT [facility] IF THERE IS ANY CONCERNS 
      WITH NEGLECT AND FOLLOW UP CARE TO MEDICAL NEEDS.
10/08/2018    [facility]
 
      - PATIENT IS CURRENTLY ON HOME HEALTH SERVICES. IF PATIENT IS SEEN IN THE ED 
      PLEASE CONTACT HOME HEALTH ON CALL RN, AND OR HOME HEALTH OFFICE DURING
      WORKING HOURS.
      - [phone]. IF NOT ABLE TO CONTACT AT THE NUMBER LISTED. PLEASE CALL [phone] AND REQUEST FOR THE ON CALL RN FOR HOME HEALTH.
E.LULU. VISIT COUNT (12 MO.)
-------------------------------------------------------------------------------------
8 [facility].
-------------------------------------------------------------------------------------
TOTAL 8
 
-------------------------------------------------------------------------------------
NOTE: Visits indicate total known visits.
 
ED/UCC VISIT TRACKING (12 MO.)
-------------------------------------------------------------------------------------
02/26/2019 09:19
[facility] [place]
 
TYPE: Emergency
 
COMPLAINT:
- WEAKNESS
-------------------------------------------------------------------------------------
02/17/2019 16:14
[facility] [place]
 
TYPE: Emergency
 
COMPLAINT:
- ABD PAIN
 
DIAGNOSES:
- Long term (current) use of aspirin
- Type 2 diabetes mellitus without complications
- Long term (current) use of oral hypoglycemic drugs
- Generalized abdominal pain
- Unspecified dementia without behavioral disturbance
- Heart failure, unspecified
- Allergy status to other drugs, medicaments and biological substances status
- Other long term (current) drug therapy
- Chronic obstructive pulmonary disease, unspecified
-------------------------------------------------------------------------------------
12/29/2018 10:44
[facility] [place]
 
TYPE: Emergency
 
COMPLAINT:
- SOB/WEAKNESS
-------------------------------------------------------------------------------------
11/02/2018 11:56
[facility] [place]
 
TYPE: Emergency
 
COMPLAINT:
- MEDICATION REFILL
 
DIAGNOSES:
- Nonspecific low blood-pressure reading
- Long term (current) use of aspirin
- Heart failure, unspecified
- Unspecified dementia without behavioral disturbance
- Syncope and collapse
- Personal history of nicotine dependence
- Anemia, unspecified
- Encounter for issue of repeat prescription
- Chronic obstructive pulmonary disease, unspecified
- Type 2 diabetes mellitus without complications
- Other long term (current) drug therapy
 
-------------------------------------------------------------------------------------
10/17/2018 11:37
[facility]
 
TYPE: Emergency
 
COMPLAINT:
- PNEUMONIA
 
DIAGNOSES:
- Long term (current) use of aspirin
- Shortness of breath
- Chronic obstructive pulmonary disease, unspecified
- Other long term (current) drug therapy
- Type 2 diabetes mellitus without complications
- Heart failure, unspecified
- Personal history of nicotine dependence
- Long term (current) use of oral hypoglycemic drugs
-------------------------------------------------------------------------------------
10/05/2018 15:33
[facility] [place]
 
TYPE: Emergency
 
COMPLAINT:
- BP PROBLEM/DARK STOOL
 
DIAGNOSES:
- Other long term (current) drug therapy
- Personal history of nicotine dependence
- Long term (current) use of aspirin
- Nonspecific low blood-pressure reading
- Chronic obstructive pulmonary disease, unspecified
- Type 2 diabetes mellitus without complications
-------------------------------------------------------------------------------------
09/20/2018 12:08
[facility] [place]
 
TYPE: Emergency
 
COMPLAINT:
- SOB
-------------------------------------------------------------------------------------
06/20/2018 13:07
[facility]
 
TYPE: Emergency
 
COMPLAINT:
- DIFFICULTY BREATHING,ABD PAIN
 
DIAGNOSES:
- Long term (current) use of aspirin
- Chronic obstructive pulmonary disease, unspecified
- Unspecified abdominal pain
- LONG TERM (CURRENT) USE OF ORAL HYPOGLYCEMIC DRUGS
- Other long term (current) drug therapy
- Long term (current) use of opiate analgesic
- Shortness of breath
- Unspecified abdominal pain
 
- Heart failure, unspecified
- Chronic pain syndrome
- Long term (current) use of oral hypoglycemic drugs
- Personal history of nicotine dependence
- Type 2 diabetes mellitus without complications
-------------------------------------------------------------------------------------
 
 
INPATIENT VISIT TRACKING (12 MO.)
-------------------------------------------------------------------------------------
12/29/2018 15:46
[facility]
 
TYPE: Medical Surgical
 
COMPLAINT:
- METABOLIC ENCEPHALOPATHY
 
DIAGNOSES:
- Retention of urine, unspecified
- Unspecified dementia without behavioral disturbance
- Acute kidney failure, unspecified
- Hypertensive heart disease with heart failure
- Unspecified Escherichia coli [E. coli] as the cause of diseases classified
elsewhere
- Urinary tract infection, site not specified
- Chronic obstructive pulmonary disease, unspecified
- Adverse effect of other antiepileptic and sedative-hypnotic drugs, initial
encounter
- Heart failure, unspecified
- Long term (current) use of oral hypoglycemic drugs
- Type 2 diabetes mellitus without complications
- Moderate protein-calorie malnutrition
- Toxic encephalopathy
- Long term (current) use of aspirin
- Atherosclerotic heart disease of native coronary artery without angina pectoris
-------------------------------------------------------------------------------------
09/24/2018 12:15
[facility] [place]
 
TYPE: Medical Surgical
 
COMPLAINT:
- DECONDITIONING
 
DIAGNOSES:
- Unspecified mood [affective] disorder
- Paroxysmal atrial fibrillation
- Hyperlipidemia, unspecified
- Chronic obstructive pulmonary disease with acute lower respiratory infection
- Disease of pancreas, unspecified
- Dependence on supplemental oxygen
- Long term (current) use of inhaled steroids
- Dorsalgia, unspecified
- Other retention of urine
- Weakness
- Long term (current) use of aspirin
- Abnormal radiologic findings on diagnostic imaging of other urinary organs
- Chronic respiratory failure with hypoxia
- Other chronic pain
 
- Gastro-esophageal reflux disease without esophagitis
- Acquired absence of other specified parts of digestive tract
- Other long term (current) drug therapy
- Bladder-neck obstruction
- Benign prostatic hyperplasia with lower urinary tract symptoms
- Other specified diseases of liver
- Unsteadiness on feet
- Unspecified dementia with behavioral disturbance
- Cyst of kidney, acquired
- Pneumonia due to Streptococcus pneumoniae
- Unilateral inguinal hernia, without obstruction or gangrene, not specified as
recurrent
- Long term (current) use of oral hypoglycemic drugs
- Personal history of nicotine dependence
- Type 2 diabetes mellitus without complications
-------------------------------------------------------------------------------------
09/20/2018 17:08
[facility] [place]
 
TYPE: Medical Surgical
 
COMPLAINT:
- PNEUMONIA
 
DIAGNOSES:
- Other specified diseases of liver
- Unspecified dementia with behavioral disturbance
- Chronic respiratory failure with hypoxia
- Personal history of nicotine dependence
- Dorsalgia, unspecified
- Diverticulum of bladder
- Chronic obstructive pulmonary disease, unspecified
- Long term (current) use of insulin
- Pneumonia, unspecified organism
- Disease of pancreas, unspecified
- Unilateral inguinal hernia, without obstruction or gangrene, not specified as
recurrent
- Type 2 diabetes mellitus without complications
- Unspecified atrial fibrillation
- Cyst of kidney, acquired
- Other retention of urine
- Other malaise
- Sepsis due to Streptococcus pneumoniae
- Benign prostatic hyperplasia with lower urinary tract symptoms
-------------------------------------------------------------------------------------
 
[email or web address]

## 2019-02-26 NOTE — EKG
[facility]
                                    [address]
                                  [place], [place]  [address]
_________________________________________________________________________________________
                                                                 Draft    
 
 
EKG completed, results pending confirmation
 
 
 
 
 
 
 
 
 
 
 
 
 
 
 
 
 
 
 
 
 
 
 
 
 
 
 
 
 
 
 
 
 
 
 
 
 
 
 
 
 
 
 
 
                                                                                    
_________________________________________________________________________________________
PATIENT NAME:     PARISH HURTADO JR           
MEDICAL RECORD #: [id number]                     Electrocardiogram             
          ACCT #: [id number]  
DATE OF BIRTH:   [birth year]                                       
PHYSICIAN:   PRELIMINARY                        REPORT #: [id number]
REPORT IS CONFIDENTIAL AND NOT TO BE RELEASED WITHOUT AUTHORIZATION

## 2019-02-26 NOTE — NUR
COMPLETED ASSESSMENT AND ADMINISTERED MEDICATIONS. BROUGHT PT FRESH COFFEE AND
WATER. HE VOIDED AND DENIES FURTHER NEEDS.

## 2019-02-26 NOTE — NUR
ASSISTED PT TO STAND AT EDGE OF BED AND USE URINAL. HE IS BACK IN BED WITH
ALARM ON. CALL LIGHT IS CLOSE.

## 2019-02-26 NOTE — NUR
PT ARRIVED FROM ED. PT TRANSFERES SELF TO BED. 4L O2 BY NC IN PLACE, 100% O2
SATURATION. ASSESSMENT DONE. PT REPORTS HE FELL "2 WEEKS AGO. MY HEAD JUST
HURT SO BAD AND MY SIDE WENT NUMB AND I WENT DOWN." PT ORIENTED TO USING
EQUIPMENT IN ROOM. PT ANSWERS QUESTIONS AND FOLLOWS DIRETIONS, ORIENTED TO ALL
BUT YEAR. FAMILY REPORTS SLURRED SPEACH, NOTED BY THIS RN AT TIMES, SLURRED
SPEACH COMES AND GOES. NO FACIAL DROUP NOTED. PT ABLE TO HOLD ARMS UP, NO
DRIFT NOTED, STICK OUT TOUNG, AND SMILE. PT REPORTS 6/10 PAIN IN LOWER BACK.
PT REPORTS NAS[place]A "A LITTLE SOMETIMES." PT REPORTS BLINDNESS IN RIGHT EYE. PT
DEMONSTRATES USE OF CALL LIGHT. BED RAILS UP. BED ALARM ON.

## 2019-02-26 NOTE — NUR
PUMP ALARMING, "DISTAL OCCLUSION." PTS ARM STRAIGHTENED. INFUSION RESTARTED.
PT REPORTS 7/10 PAIN IN LEFT HIP AND BACK. SEE MAR FOR MEDICATION GIVEN.
DINNER ARRIVED. PT ASSISTED WITH GETTING MEAL READY. PT EATING DINNER. BED
RAILS UP. BED ALARM ON. NO ADDITIONAL REQUESTS OR COMPLAINTS.

## 2019-02-27 NOTE — NUR
ADMINISTERED NORCO FOR 6/10 LEFT SIDED BACK, HIP, AND KNEE PAIN. HE DENIES
FURTHER NEEDS AND CALL LIGHT IS CLOSE WITH BED ALARM ON.

## 2019-02-27 NOTE — NUR
PT HAS DRLR INFUSING AT 125MLS/HR AND HAD A 1.5 ML BOLUS YESTERDAY EVENING. HE
IS QS IN URINAL AND HAD 2 INCONTINENT EPISODES. PT IS ON 4 LNC CHRONIC AND
AMBULATES 1PA WITH FWW. HE IS FORGETFUL AT TIME AND ONLY DISORIENTED TO DATE.
NORCO WAS GIVEN FOR BACK, HIP AND KNEE PAIN ON THE LEFT. HIS SPEECH IS GARBLED
OR SLURRED SOUNDING AT TIMES. HE HAS SCHEDULED AND PRN NEBS.

## 2019-02-27 NOTE — NUR
HEART RATE ON TELE 120-160. DR LY AWARE. NEW ORDERS FOR IV AND PO
METOPROLOL PLACED. PT VISIBLE FROM NURSING STATION. CALL LIGHT IN PLACE.

## 2019-02-27 NOTE — NUR
PT SITTING ON SIDE OF BED, SAID HE IS GETTING THINGS ORGANIZED ON HIS TABLE SO
HE CAN DRAW. PT SEEMS SOMEWHAT CONFUSED, BUT PLEASANT. EXTENDED A BLESSING, PT
SHOOK MY HAND AND THANKED ME FOR COMING BY. WILL FOLLOW AS NEEDED

## 2019-02-27 NOTE — NUR
NOTIFIED PT MORE CONFUSED THAN HAS BEEN THIS AM. PT VERBALIZED HIS
GRANDSON IS SITTING IN BIG CHAIR IN CORNER, NO ONE IS VISITING PT IN ROOM AT
THIS TIME. PT GOT OUT OF BED, WONDERING ABOUT ROOM SPILT SEVERAL DRINKS FROM
BEDSIDE TABLE. STAFF IN ROOM NOW.

## 2019-02-27 NOTE — NUR
SHIFT REPORT RECEIVED. PATIENT STANDING AT EDGE OF BED. REPORTS PAIN IN LEFT
LEG. PRN NORCO PROVIDED. PATIENT CONTINUES TO BE 1:1 AND REQUIRES FREQUEST
REDIRECTION. ASSISTED THE PATIENT TO SITTING ON EDGE OF BED. ORANGE JUICE
PROVIDED PER REQUEST. PATIENT HAVING VISUAL DISTURBANCES AND SLURRED SPEECH.
STATES HE IS FRUSTERATED WITH PEOPLE TELLING HIM HE CAN'T DO THINGS ALL OF THE
TIME. APPEARS UPSET WITH STAFF. ATTEMPTS TO REORIENT AND REDIRECT ARE
MINIMALLY SUCESSFUL.

## 2019-02-27 NOTE — NUR
Pt. lying inbe . Nurse in room completing assessment. Pt. states he is in
pain.HR noted at 119 BPM. RN notified.

## 2019-02-27 NOTE — NUR
Pt. lying in bed. Bladder palpated and felt firm. Bladder scan revealed 246
mls. Pt. denies discomfort with scan. Call light within reach.

## 2019-02-27 NOTE — NUR
Pt. sitting on side of bed requesting to use bathroom. Pt. ambulated to
bathroom with front wheel walker and 1 person assist, gait steady, slower when
returning to bed related to left hip pain. Pt. able to void, missed hatin
neena;et, urine not measured. Pt. left in bed, call light in reach.

## 2019-02-27 NOTE — NUR
Pt. sitting on edge of bed drinking coffee. Pt.  reports having pain, 9/10.
Pt. reports seeing his grandson that isnt in [place], RN aware.

## 2019-02-27 NOTE — NUR
TALKED WITH DAUGHTER ABOUT PT/ASSESSMENT DONE VIA PHONE WITH HER AS PT IS TOO
CONFUSED TO GIVE APPROPRIATE ANSWERS.  SHE STATES THAT IS NURMAL THING FOR HIM
TO GET MORE CONFUSED AND THEN RETURN TO HIS LESS CONFUSED STATES, SHE STATES
THAT IS HOW HIS DEMENTIA WORKS.  SHE IS PLANNING ON HIM RETURNING HOME UPON
DC.

## 2019-02-27 NOTE — NUR
Pt. lying in bed.Medication administration completed. Pt. able to swalllow
medication independently without difficulty. Pt. reported having no issues at
this time. Call light within reach.

## 2019-02-27 NOTE — NUR
CNA APRIL IN ROOM FOR 1:1, PATIENT ATTEMPTING TO GET OUT OF BED FREQUENTLY AND
REQUIRING REDIRECTION. PATIENT HAVING ONGOING HALLUCINATIONS, SOME VIOLENT IN
NATURE. LISETH RICHARD ABLE TO ASSIST IN PLACING NEW IV SITE.

## 2019-02-27 NOTE — NUR
PATIENT INSISTENT TO GO FOR A WALK. PATIENT AMBULATED WITH A STEADY GAIT, BUT
REQUIRES CONSTANT DIRECTIONS DUE TO CONFUSION ON USING THE WALKER AND
DIFFICULTY WITH SIGHT. PATIENT WALKED ABOUT 50FT IN THE HALLWAY TOTAL.
RETURNED TO HIS ROOM, ASSISTED PATIENT TO THE BATHROOM. PATIENT RETURNED TO
BED AND VS DONE. SCHEDULED MEDS PROVIDED. PATIENT POSITIONED FOR COMFORT IN
THE BED. HE REPORTS BEING TIRED AND IS TALKING WITH HIS DUGHTER LYLE, WHO IS
NOT IN THE ROOM. UNABLE TO REORIENT PATIENT AT THIS TIME. REMAINS 1:1 FOR
SAFETY.

## 2019-02-27 NOTE — NUR
Pt. sitting on side of bed Iv flushed, patent, site benign. Norco and IV
metoprolol administered. No adveresed reactions noticed. Call light within
reach.

## 2019-02-27 NOTE — NUR
DAVINA LEES AT BEDSIDE, PT 1:1 STATUS D/T, INCREASED MOBILITY AND CONFUSION.
PT UNABLE TO USE CALL LGIHT APPROPRIATLY AND IS HAVING AUDITORY AND VISUAL
HALUCINATIONS.

## 2019-02-27 NOTE — NUR
PT WITH DECREASED OUTPUT. DR LY NOTIFIED. ORDER TO INCREASE D5LR TO
125ML/HR. NO OTHER ORDERS. PT 1:1 WITH 4L NC IN PLACE. TELE #7 WITH IRREGULAR
HR AT 90. VISIBLE FROM NURSING STATION. PT COOPERATIVE WITH CARES.

## 2019-02-27 NOTE — NUR
PATIENT RESTING IN BED. REACHING FOR THINGS THAT ARE NOT THERE AND TALKING
ABOUT DOGS BEING IN HIS ROOM. UNABLE TO REORIENT PATIENT AT THIS TIME.
PATIENT'S LUNGS ARE CLEAR, DIMINISHED IN THE BASED. OCCATIONALY CONGESTED
COUGH, NONPRODUCTIVE. 4L NC IN PLACE. PATIENT ORIENTED TO SELF AND .
REPORTS PAIN IN LEFT HIP AND LEG. REPOSITIONED FOR COMFORT. PATIENT UNABLE TO
REPORT NUMBNESS OR TINGLING. SENSATION INTACT, PULSES WNL IN ALL 4
EXTREMITIES. SKIN INTACT. ATTENDS IN PLACE.

## 2019-02-27 NOTE — NUR
Pt. sitting up at edge of bed. Pt. given coffee per request. Pt. sitting on
edge of ofbed drawing. No other need reported atthis time. Call light within
reach.

## 2019-02-27 NOTE — NUR
PATIENT HAS BEEN UP TO THE BATHROOM SEVERAL TIMES AND HAS BECOME INCREASING
DIFFICULT TO REDIRECT. PATIENT PULLED HIS IV SITE. WHILE IN THE BATHROOM THE
PATIENT BECAME UNSTEADY WHILE ATTEMPTING TO PULL HIS PANTS UP. THIS RN REACHED
OUT TO STEADY THE PATIENT AND THE PATIENT BECAME VIOLENT, PUSHING STAFF AND
ALMOST FALLING. ABLE TO REDIRECT PATIENT SAFELY TO HIS BED. CNA IN ROOM TO
ASSIST. NOTIFIED  AND NEW ORDERS INPUT BY HIM FOR SLEEP AIDE.

## 2019-02-27 NOTE — NUR
PT IS SITTING AT EDGE OF BED COLORING AND DRINKING COFFEE. HE DENIES NEEDS AT
THIS TIME. CALL LIGHT IS CLOSE AND BED ALARM IS ON.

## 2019-02-27 NOTE — NUR
REPORT RECEIVED FROM NIGHT SHIFT RN. PT IN BED WITH EYES CLOSED. RESPIRAITONS
EQUAL AND NONLABORED. 4L NC IN PLACE. D5LR AT 125ML/HR. BED ALARM IN PLACE AND
VISIBLE FROM NURSING STATION. CALL LIGHT IN REACH

## 2019-02-27 NOTE — NUR
PATIENT SPILLED COFFEE AND HIS MILKSHAKE. CNA WENT IN ROOM AND GOT IT CLEANED
UP WITH THE HELP OF SABRINA FROM [facility]. PATIENT GOT NEW SOCKS
AND PANTS.

## 2019-02-27 NOTE — NUR
PT REPORTS PAIN IS BETTER. HE IS VOIDING QS AND HAD A LARGE INCONTINENT VOID.
HE DENIES NEEDS AND CALL LIGHT IS WITHIN REACH, BED ALARM IS ON.

## 2019-02-27 NOTE — NUR
Pt. sitting on side of bed drawing. Tele placed on Pt. Pt reporting he has
pain on left hip. HR on tele 120-160. RN aware.

## 2019-02-27 NOTE — NUR
ASSESSMENT DONE, APICAL PULSE . VITAL SIGNS TAKEN. BLOOD PRESSURE WNL.
DR LY NOTIFIED OF INCREASED PULSE. NEW ORDER FOR TELEMETRY TO BE PLACED.

## 2019-02-27 NOTE — NUR
PT HAD GOOD DAY, DECREASED URINE OUTPUT. D5LR @ 125. IN RIGHT AC. PT IS NOW
1:1 D/T CONFUSION AND COMPUSION. 4L NC CHRONIC. URINAL AT BEDSIDE, PT WITH
FREQUENCY AND RETENTION. TELE #7 IN PLACE WITH IRREGULAR HEART RATE .
NORCO FOR CHRONIC BACK PAIN. LIDOCAIN PATCH ON LEFT LOWER BACK. NEBS
SCHEDULED AND PRN. DISORIENTED TO DATE. PT WITH AUDITORY AND VISUAL
HALLUCINATIONS.

## 2019-02-28 NOTE — NUR
PATIENT CONTINUES TO BE 1:1. HAS BEEN COMPLIANT WITH WEARING HIS NC SOME OF
THE TIME BUT REFUSING TO LAY IN BED. HAS BEEN SITTING ON THE EDGE OF THE BED.
PATIENT HAS ONGOING VISUAL AND AUDITORY HALLUCINATIONS THAT APPEAR VIOLENT IN
NATURE. HOWEVER PATIENT IS ABLE TO ANSWER ORIENTATION QUESTIONS REGAURDING HIS
NAME AND .  UPDATE GIVEN TO DR. LY AND NEW ORDERS RECEIVED. ORDERED MEDS
GIVEN. PATIENT REDIRECTED INTO LAYING POSITION IN THE HIS BED.

## 2019-02-28 NOTE — NUR
PT SLEEPING AT THIS TIME. PT REMAINS A 1 ON 1 AND THE BED ALARM IS ON AND HIS
CALL LIGHT IS IN REACH.

## 2019-02-28 NOTE — NUR
PATIENT CONTINUES TO BE 1:1 WITH CNA APRIL. PATIENT ALLOWED STAFF TO PUT HIS
NC IN PLACE. HOWEVER HE IS REFUSING TO LAY IN BED, STANDING AT THE SIDE OR
LAYING ACROSS THE FOOT OF THE BED. OFFERED TO REPOSITION PATIENT AND HE
BECOMES ANGRY, STATING "NO ONE IS TELLING ME WHAT TO DO" AND YELLING AT STAFF.

## 2019-02-28 NOTE — NUR
PT IS CURRENTLY SLEEPING AT THIS TIME. TELE HAS BEEN DISCONNECTED PER MD
REQUEST. RESPIRATIONS ARE EVEN. SKIN IS WARM AND DRY. CALL LIGHT IS IN REACH.
WILL CONTINUE TO MONITOR PT.

## 2019-02-28 NOTE — NUR
PT CONTINUES RESTING AT THE BEDSIDE AND HE CONTINUES TO DENIE ANY PROBLMES. PT
JUST FINISHED A BREATHING TREATMENET WITH RT.

## 2019-02-28 NOTE — NUR
1025: Pt awoke and he denies any pain or sob. Pt states he needs to use the BR
and he was assisted with the urinal and he voided a small amount. The pt had
also been incont of urine. The pt was cleaned up and new attends placed. Pt
now sitting at the bed side having a sip of coffee. Pt is alert to person not
place and time. Pt was unsteady on his feet when he stood to use the urinal
and the pt was reminded to always have help when he stands. Pt remains a 1 on
1 for safety reasons.

## 2019-02-28 NOTE — NUR
PATIENT CONTINUES TO BE 1:1. PATIENT IS RESTLESS AND REQUIRES FREQUENT
REDIRECTION. PATIENT YELLING AT PEOPLE THAT ARE NOT IN THE ROOM AND APPEARS TO
BE HALLUCINATING. REASSURANCE PROVIDED FREQUENTLY.

## 2019-02-28 NOTE — NUR
PATIENT CONTINUES TO REQUIRE FREQUENT REDIRECTING. CNA APRIL AT BEDSIDE.
PATIENT TALKING TO PEOPLE THAT ARE NOT IN THE ROOM AND YELLING OUT FREQUENTLY.
HOWEVER, PATIENT IS REMAINING IN BED LAYING DOWN AT THIS TIME.

## 2019-02-28 NOTE — NUR
PT'S DAUGHTER (LYLE)
ARRIVED TO TAKE THE PT HOME. PT'S IV WAS DC'D TIP INTACT. RX TO
THE ROOM TO SPEAK WITH THE PT'S DAUGHTER AT THIS TIME.

## 2019-02-28 NOTE — NUR
PATIENT WAS 1:1 WITH STAFF THIS FULL SHIFT. REQUIRED FREQUENT REASSURANCE AND
REDIRECTION. ORIENTED TO SELF AND  ONLY. OCCATIONALLY ABLE TO BE ORIENTED
TO SURROUNDINGS. PATIENT DID NOT SLEEP THIS SHIFT. HAD ONGOING AUDITORY AND
VISUAL HALLUCINATIONS. 4L NC. VS STABLE. 1-2PA, UNSTEADY GAIT. LIMITED
AMBULATION FOR SAFETY. FREQUENTLY UNABLE TO FOLLOW DIRECTIONS. IV FLUIDS PER
ORDER. ENCOURAGE PO INTAKE. URINE OUTPUT QS. LEFT LEG/HIP PAIN. PRN NORCO AND
TYLENOL.

## 2019-02-28 NOTE — NUR
PT APPEARS TO BE SLEEPING AT THIS TIME. RESPIRATIONS ARE EVEN. SKIN IS WARM
AND DRY. WILL CONTINUE TO MONITOR PT. CALL LIGHT IS IN REACH.

## 2019-02-28 NOTE — NUR
Report recieved from Maricel RICHARD. Pt sleeping at this time. His bp is 174/72 and
his temp is 99.9, Dr Almodovar was notified of this per Maricel RICHARD. Verbal order
received from Dr Almodovar to allow the pt to sleep and not wake him, to include
for medications.

## 2019-02-28 NOTE — NUR
PATIENT'S HR 80'S. PATIENT RESTING IN BED. EYES OPEN, TALKING TO PEOPLE THAT
ARE NOT IN THE ROOM. 1:1 FOR SAFETY.

## 2019-02-28 NOTE — NUR
PATIENT REFUSING TO WEAR HIS NC OR AN OXIMASK. PATIENT STATES "I'M NOT LETTING
ANYONE TELL ME WHAT TO DO IN MY OWN HOUSE". ATTEMPTS TO REORIENT THE PATIENT
ARE UNSUCCESSFUL. PATIENT ATTEMPTING TO GET OUT OF BED AND STATES "I'M
LEAVING". PATIENT USING PROFRANITY AND YELLING AT STAFF. CHARGE RN KIMBERLEY IN
ROOM IN ATTEMPTS TO REDIRECT PATIENT.

## 2019-02-28 NOTE — NUR
PATIENT ASSESSMENT DONE.  PATIENT IS SLEEPING SOUNDLY, ONE TO ONE STAFF IN
ROOM WITH PATIENT.  OXYGEN AND TELE 7 ON, IV INFUSING.

## 2019-03-18 NOTE — NUR
PT IN BED, AWAKE AND ALERT TO SELF. PT CONFUSED AND UNABLE TO TELL ME WHERE HE
IS AND DATE AS WELL AS SITUATION. PT VOIDING Q/S. PERSONAL SUPPLIES AND CALL
LIGHT WITHIN REACH. NO NEEDS AT THIS TIME. Principal Discharge DX:	Alcoholic intoxication without complication

## 2019-07-15 NOTE — NUR
ORDERS RECEIVED. IV FLUIDS HUNG. BOLUS RUNNING. MEDICAITON GIVEN. STRAIGHT
CATH PERFORMED BY HILARY DAWSON. PT USED URINAL AT 1640 VOIDING 50ML. 200ML
OBTAINED FROM STRAIGHT CATH. SAMPLE SENT TO LAB PER MD ORDER. RT CALLED FOR
CONSULT AND NEBULIZER TREATMENT. INCENTIVE SPIOMETER GIVEN TO PT. PT
DEMONSTRATES USE REACHING 1250. MEDICATION GIVEN (SEE MAR). PT RESTING IN BED.
BED RAILS UP. CALL LIGHT WITHIN REACH. BED ALARM ON. yes

## 2019-10-23 NOTE — XMS
PreManage Notification: [facility] MRN:[id number]
 
Security Information
 
Security Events
No recent Security Events currently on file
 
 
 
CRITERIA MET
------------
- [facility] - Has Care Guidelines
 
 
CARE PROVIDERS
-------------------------------------------------------------------------------------
Anirudh Sims     Internal Medicine: Pulmonary Disease     09/21/2018-Current
 
PHONE: Unknown
-------------------------------------------------------------------------------------
Marixa Briones     /Care Coordinator     01/03/2019-Current
 
PHONE: [phone]
-------------------------------------------------------------------------------------
Marixa Briones     Primary Care     01/03/2019-Current
 
PHONE: [phone]
-------------------------------------------------------------------------------------
 
Michelle has no Care Guidelines for this patient.
Care History
Medical/Surgical
12/19/2018    [facility]
 
      - PATIENT WAS TRANSFERRED TO [facility]. PLEASE 
      CONTACT [facility] IF PATIENT IS SEEN IN THE ED- [phone].
      - PATIENT HAS AN APT WITH PCP ON 12/30/18 @ 11:00AM.
12/05/2018    [facility]
 
      - [facility] AND THE [facility] ARE NOT ABLE TO WORK WITH PATIENT.
      
      - DUE TO MULTIPLE APD REFERRALS MADE ON PATIENT- PATIENT DAUGHTER IS NO LONGER 
      ALLOWING HELP/SERVICES FROM THE CLINIC AND [facility].
      - IF PATIENT IS SEEN IN THE ED PLEASE CONTACT [facility] IF THERE IS ANY CONCERNS 
      WITH NEGLECT AND FOLLOW UP CARE TO MEDICAL NEEDS.
10/08/2018    [facility]
 
      - PATIENT IS CURRENTLY ON HOME HEALTH SERVICES. IF PATIENT IS SEEN IN THE ED 
      PLEASE CONTACT HOME HEALTH ON CALL RN, AND OR HOME HEALTH OFFICE DURING
      WORKING HOURS.
      - [phone]. IF NOT ABLE TO CONTACT AT THE NUMBER LISTED. PLEASE CALL [phone] AND REQUEST FOR THE ON CALL RN FOR HOME HEALTH.
JAIDEN VISIT COUNT (12 MO.)
-------------------------------------------------------------------------------------
5 [facility]
-------------------------------------------------------------------------------------
TOTAL 5
-------------------------------------------------------------------------------------
NOTE: Visits indicate total known visits.
 
 
ED/C VISIT TRACKING (12 MO.)
-------------------------------------------------------------------------------------
10/23/2019 14:40
[facility] [place]
 
TYPE: Emergency
 
COMPLAINT:
- FALL, INJ
-------------------------------------------------------------------------------------
02/26/2019 09:19
[facility] [place]
 
TYPE: Emergency
 
COMPLAINT:
- WEAKNESS
-------------------------------------------------------------------------------------
02/17/2019 16:14
[facility] [place]
 
TYPE: Emergency
 
COMPLAINT:
- ABD PAIN
 
DIAGNOSES:
- Long term (current) use of aspirin
- 1 Type 2 diabetes mellitus without complications
- Long term (current) use of oral hypoglycemic drugs
- Generalized abdominal pain
- Unspecified dementia without behavioral disturbance
- Heart failure, unspecified
- Allergy status to oth drug/meds/biol subst status
- Other long term (current) drug therapy
- Chronic obstructive pulmonary disease, unspecified
-------------------------------------------------------------------------------------
12/29/2018 10:44
[facility] [place]
 
TYPE: Emergency
 
COMPLAINT:
- SOB/WEAKNESS
-------------------------------------------------------------------------------------
11/02/2018 11:56
[facility] [place]
 
TYPE: Emergency
 
COMPLAINT:
- MEDICATION REFILL
 
DIAGNOSES:
- Nonspecific low blood-pressure reading
- Long term (current) use of aspirin
- Heart failure, unspecified
- Unspecified dementia without behavioral disturbance
- Syncope and collapse
 
- Personal history of nicotine dependence
- Anemia, unspecified
- Encounter for issue of repeat prescription
- Chronic obstructive pulmonary disease, unspecified
- 1 Type 2 diabetes mellitus without complications
- Other long term (current) drug therapy
-------------------------------------------------------------------------------------
 
 
INPATIENT VISIT TRACKING (12 MO.)
-------------------------------------------------------------------------------------
02/26/2019 09:20
[facility] [place]
 
TYPE: Observation
 
COMPLAINT:
- ENCEPHALOPATHY
 
DIAGNOSES:
- Long term (current) use of inhaled steroids
- Insomnia, unspecified
- Metabolic encephalopathy
- Chronic obstructive pulmonary disease, unspecified
- Long term (current) use of aspirin
- Other long term (current) drug therapy
- Gastro-esophageal reflux disease without esophagitis
- Chronic respiratory failure with hypoxia
- Benign prostatic hyperplasia without lower urinry tract symp
- Hyperlipidemia, unspecified
- Dependence on supplemental oxygen
- Other chronic pain
- Unspecified dementia with behavioral disturbance
- Dehydration
- Allergy status to oth drug/meds/biol subst status
- Major depressive disorder, single episode, unspecified
- Spinal stenosis, site unspecified
- Altered mental status, unspecified
- Unspecified abdominal pain
- Paroxysmal atrial fibrillation
-------------------------------------------------------------------------------------
12/29/2018 15:46
[facility] [place]
 
TYPE: Medical Surgical
 
COMPLAINT:
- METABOLIC ENCEPHALOPATHY
 
DIAGNOSES:
- Retention of urine, unspecified
- Unspecified dementia without behavioral disturbance
- Acute kidney failure, unspecified
- Hypertensive heart disease with heart failure
- Unsp Escherichia coli as the cause of diseases classd elswhr
- Urinary tract infection, site not specified
- Chronic obstructive pulmonary disease, unspecified
- Adverse effect of antiepileptic and sed-hypntc drugs, init
- Heart failure, unspecified
- Long term (current) use of oral hypoglycemic drugs
 
- 1 Type 2 diabetes mellitus without complications
- Moderate protein-calorie malnutrition
- Toxic encephalopathy
- Long term (current) use of aspirin
- Athscl heart disease of native coronary artery w/o ang pctrs
-------------------------------------------------------------------------------------
 
[email or web address]

## 2019-10-29 NOTE — XMS
PreManage Notification: PARISH HURTADO MRN:[id number]
 
Security Information
 
Security Events
No recent Security Events currently on file
 
 
 
CRITERIA MET
------------
- [facility] - Has Care Guidelines
- [facility] - 2 Visits in 30 Days
 
 
CARE PROVIDERS
-------------------------------------------------------------------------------------
Anirudh Sims     Internal Medicine: Pulmonary Disease     09/21/2018-Current
 
PHONE: Unknown
-------------------------------------------------------------------------------------
Marixa Briones     /Care Coordinator     01/03/2019-Current
 
PHONE: [phone]
-------------------------------------------------------------------------------------
BAILEY LY            Internal Medicine     10/24/2019-Current
[place]
 
PHONE: [phone]
-------------------------------------------------------------------------------------
Marixa Briones     Primary Care     01/03/2019-Current
 
PHONE: [phone]
-------------------------------------------------------------------------------------
 
Michelle has no Care Guidelines for this patient.
Care History
Medical/Surgical
12/19/2018    [facility]
 
      - PATIENT WAS TRANSFERRED TO [facility]. PLEASE 
      CONTACT [facility] IF PATIENT IS SEEN IN THE ED- [phone].
      - PATIENT HAS AN APT WITH PCP ON 12/30/18 @ 11:00AM.
12/05/2018    [facility]
 
      - [facility] OF [facility] AND THE HOSPITAL ARE NOT ABLE TO WORK WITH PATIENT.
      
      - DUE TO MULTIPLE APD REFERRALS MADE ON PATIENT- PATIENT DAUGHTER IS NO LONGER 
      ALLOWING HELP/SERVICES FROM THE CLINIC AND CH[facility].
      - IF PATIENT IS SEEN IN THE ED PLEASE CONTACT [facility] IF THERE IS ANY CONCERNS 
      WITH NEGLECT AND FOLLOW UP CARE TO MEDICAL NEEDS.
10/08/2018    [facility]
 
      - PATIENT IS CURRENTLY ON HOME HEALTH SERVICES. IF PATIENT IS SEEN IN THE ED 
      PLEASE CONTACT [facility] ON CALL RN, AND OR HOME HEALTH OFFICE DURING
      WORKING HOURS.
      - [phone]. IF NOT ABLE TO CONTACT AT THE NUMBER LISTED. PLEASE CALL [phone] AND REQUEST FOR THE ON CALL RN FOR HOME HEALTH.
E.D. VISIT COUNT (12 MO.)
 
-------------------------------------------------------------------------------------
6 [facility].
-------------------------------------------------------------------------------------
TOTAL 6
-------------------------------------------------------------------------------------
NOTE: Visits indicate total known visits.
 
ED/UCC VISIT TRACKING (12 MO.)
-------------------------------------------------------------------------------------
10/29/2019 17:19
[facility] [place]
 
TYPE: Emergency
 
COMPLAINT:
- TESTICULAR PROBLEM
-------------------------------------------------------------------------------------
10/23/2019 14:40
[facility] [place]
 
TYPE: Emergency
 
COMPLAINT:
- FALL, INJ
 
DIAGNOSES:
- Heart failure, unspecified
- Nausea with vomiting, unspecified
- Unsp fracture of T11-T12 vertebra, init for clos fx
- Chronic obstructive pulmonary disease, unspecified
- Hypertensive heart disease with heart failure
- Fall same lev from slip/trip w strike agnst oth object, init
- 1 Type 2 diabetes mellitus without complications
- Unspecified injury of head, initial encounter
-------------------------------------------------------------------------------------
02/26/2019 09:19
[facility] [place]
 
TYPE: Emergency
 
COMPLAINT:
- WEAKNESS
-------------------------------------------------------------------------------------
02/17/2019 16:14
[facility] [place]
 
TYPE: Emergency
 
COMPLAINT:
- ABD PAIN
 
DIAGNOSES:
- Long term (current) use of aspirin
- 1 Type 2 diabetes mellitus without complications
- Long term (current) use of oral hypoglycemic drugs
- Generalized abdominal pain
- Unspecified dementia without behavioral disturbance
- Heart failure, unspecified
- Allergy status to oth drug/meds/biol subst status
- Other long term (current) drug therapy
 
- Chronic obstructive pulmonary disease, unspecified
-------------------------------------------------------------------------------------
12/29/2018 10:44
[facility] [place]
 
TYPE: Emergency
 
COMPLAINT:
- SOB/WEAKNESS
-------------------------------------------------------------------------------------
11/02/2018 11:56
[facility] [place]
 
TYPE: Emergency
 
COMPLAINT:
- MEDICATION REFILL
 
DIAGNOSES:
- Nonspecific low blood-pressure reading
- Long term (current) use of aspirin
- Heart failure, unspecified
- Unspecified dementia without behavioral disturbance
- Syncope and collapse
- Personal history of nicotine dependence
- Anemia, unspecified
- Encounter for issue of repeat prescription
- Chronic obstructive pulmonary disease, unspecified
- 1 Type 2 diabetes mellitus without complications
- Other long term (current) drug therapy
-------------------------------------------------------------------------------------
 
 
INPATIENT VISIT TRACKING (12 MO.)
-------------------------------------------------------------------------------------
02/26/2019 09:20
[facility] [place]
 
TYPE: Observation
 
COMPLAINT:
- ENCEPHALOPATHY
 
DIAGNOSES:
- Long term (current) use of inhaled steroids
- Insomnia, unspecified
- Metabolic encephalopathy
- Chronic obstructive pulmonary disease, unspecified
- Long term (current) use of aspirin
- Other long term (current) drug therapy
- Gastro-esophageal reflux disease without esophagitis
- Chronic respiratory failure with hypoxia
- Benign prostatic hyperplasia without lower urinry tract symp
- Hyperlipidemia, unspecified
- Dependence on supplemental oxygen
- Other chronic pain
- Unspecified dementia with behavioral disturbance
- Dehydration
- Allergy status to oth drug/meds/biol subst status
- Major depressive disorder, single episode, unspecified
 
- Spinal stenosis, site unspecified
- Altered mental status, unspecified
- Unspecified abdominal pain
- Paroxysmal atrial fibrillation
-------------------------------------------------------------------------------------
12/29/2018 15:46
[facility] [place]
 
TYPE: Medical Surgical
 
COMPLAINT:
- METABOLIC ENCEPHALOPATHY
 
DIAGNOSES:
- Retention of urine, unspecified
- Unspecified dementia without behavioral disturbance
- Acute kidney failure, unspecified
- Hypertensive heart disease with heart failure
- Unsp Escherichia coli as the cause of diseases classd elswhr
- Urinary tract infection, site not specified
- Chronic obstructive pulmonary disease, unspecified
- Adverse effect of antiepileptic and sed-hypntc drugs, init
- Heart failure, unspecified
- Long term (current) use of oral hypoglycemic drugs
- 1 Type 2 diabetes mellitus without complications
- Moderate protein-calorie malnutrition
- Toxic encephalopathy
- Long term (current) use of aspirin
- Athscl heart disease of native coronary artery w/o ang pctrs
-------------------------------------------------------------------------------------
 
[email or web address]

## 2020-02-06 NOTE — XMS
PreManage Notification: PARISH HURTADO MRN:[id number]
 
Security Information
 
Security Events
No recent Security Events currently on file
 
 
 
CRITERIA MET
------------
- [facility] - [facility] Guidelines
- History of Sepsis Dx
 
 
CARE PROVIDERS
-------------------------------------------------------------------------------------
Anirudh Sims     Internal Medicine: Pulmonary Disease     09/21/2018-Current
 
PHONE: Unknown
-------------------------------------------------------------------------------------
[facility]            [facility]     10/24/2019-Current
[place]
 
PHONE: [phone]
-------------------------------------------------------------------------------------
Daniela Malave     /Care Coordinator     01/03/2019-Current
 
PHONE: [phone]
-------------------------------------------------------------------------------------
Daniela Malave     Primary Care     01/03/2019-Current
 
PHONE: [phone]
-------------------------------------------------------------------------------------
 
Michelle has no Care Guidelines for this patient.
Care History
Medical/Surgical
12/19/2018    [facility]
 
      - PATIENT WAS TRANSFERRED TO [facility] SERVICES. PLEASE 
      CONTACT [facility] IF PATIENT IS SEEN IN THE ED- [phone].
      - PATIENT HAS AN APT WITH PCP ON 12/30/18 @ 11:00AM.
12/05/2018    [facility]
 
      - [facility] OF [facility] AND THE [facility] ARE NOT ABLE TO WORK WITH PATIENT.
      
      - DUE TO MULTIPLE APD REFERRALS MADE ON PATIENT- PATIENT DAUGHTER IS NO LONGER 
      ALLOWING HELP/SERVICES FROM THE CLINIC AND [facility].
      - IF PATIENT IS SEEN IN THE ED PLEASE CONTACT [facility] IF THERE IS ANY CONCERNS 
      WITH NEGLECT AND FOLLOW UP CARE TO MEDICAL NEEDS.
10/08/2018    [facility]
 
      - PATIENT IS CURRENTLY ON HOME HEALTH SERVICES. IF PATIENT IS SEEN IN THE ED 
      PLEASE CONTACT [facility] ON CALL RN, AND OR HOME HEALTH OFFICE DURING
      WORKING HOURS.
      - [phone]. IF NOT ABLE TO CONTACT AT THE NUMBER LISTED. PLEASE CALL [phone] AND REQUEST FOR THE ON CALL RN FOR HOME HEALTH.
E.D. VISIT COUNT (12 MO.)
 
-------------------------------------------------------------------------------------
5 [facility].
-------------------------------------------------------------------------------------
TOTAL 5
-------------------------------------------------------------------------------------
NOTE: Visits indicate total known visits.
 
ED/UCC VISIT TRACKING (12 MO.)
-------------------------------------------------------------------------------------
02/06/2020 18:37
[facility] [place]
 
TYPE: Emergency
 
COMPLAINT:
- SOB
-------------------------------------------------------------------------------------
10/29/2019 17:19
[facility] [place]
 
TYPE: Emergency
 
COMPLAINT:
- TESTICULAR PROBLEM
 
DIAGNOSES:
- Personal history of nicotine dependence
- Unspecified dementia without behavioral disturbance
- Unil inguinal hernia, w/o obst or gangr, not spcf as recur
- Hypertensive heart disease with heart failure
- 1 Type 2 diabetes mellitus without complications
- Long term (current) use of aspirin
- Heart failure, unspecified
- Chronic obstructive pulmonary disease, unspecified
- Allergy status to oth drug/meds/biol subst status
- Other long term (current) drug therapy
- Nausea with vomiting, unspecified
-------------------------------------------------------------------------------------
10/23/2019 14:40
[facility] [place]
 
TYPE: Emergency
 
COMPLAINT:
- FALL, INJ
 
DIAGNOSES:
- Heart failure, unspecified
- Nausea with vomiting, unspecified
- Unsp fracture of T11-T12 vertebra, init for clos fx
- Chronic obstructive pulmonary disease, unspecified
- Hypertensive heart disease with heart failure
- Fall same lev from slip/trip w strike agn ot object, init
- 1 Type 2 diabetes mellitus without complications
- Unspecified injury of head, initial encounter
-------------------------------------------------------------------------------------
02/26/2019 09:19
[facility] [place]
 
TYPE: Emergency
 
 
COMPLAINT:
- WEAKNESS
-------------------------------------------------------------------------------------
02/17/2019 16:14
[facility] [place]
 
TYPE: Emergency
 
COMPLAINT:
- ABD PAIN
 
DIAGNOSES:
- Long term (current) use of aspirin
- 1 Type 2 diabetes mellitus without complications
- Long term (current) use of oral hypoglycemic drugs
- Generalized abdominal pain
- Unspecified dementia without behavioral disturbance
- Heart failure, unspecified
- Allergy status to oth drug/meds/biol subst status
- Other long term (current) drug therapy
- Chronic obstructive pulmonary disease, unspecified
-------------------------------------------------------------------------------------
 
 
INPATIENT VISIT TRACKING (12 MO.)
-------------------------------------------------------------------------------------
02/26/2019 09:20
[facility] [place]
 
TYPE: Observation
 
COMPLAINT:
- ENCEPHALOPATHY
 
DIAGNOSES:
- Long term (current) use of inhaled steroids
- Insomnia, unspecified
- Metabolic encephalopathy
- Chronic obstructive pulmonary disease, unspecified
- Long term (current) use of aspirin
- Other long term (current) drug therapy
- Gastro-esophageal reflux disease without esophagitis
- Chronic respiratory failure with hypoxia
- Benign prostatic hyperplasia without lower urinry tract symp
- Hyperlipidemia, unspecified
- Dependence on supplemental oxygen
- Other chronic pain
- Unspecified dementia with behavioral disturbance
- Dehydration
- Allergy status to oth drug/meds/biol subst status
- Major depressive disorder, single episode, unspecified
- Spinal stenosis, site unspecified
- Altered mental status, unspecified
- Unspecified abdominal pain
- Paroxysmal atrial fibrillation
-------------------------------------------------------------------------------------
 
[email or web address]

## 2020-02-07 NOTE — NUR
PATIENT REPORT GIVEN TO LYDIA RICHARD ON [facility]. PATIENT TRANSFERED TO ROOM 121
VIA BED. PATIENT TOELRATED WELL AND ORIENTED TO ROOM AND STAFF MEMBERS. NO
OTHER QUESTIONS AT THIS TIME. UPDATED LYDIA CATHETER NEEDS EMPTIED. WILL CALL
PATIENTS DAUGHTER AND UPDATE ON TRANSFER PER DAUGHTERS REQUEST.

## 2020-02-07 NOTE — NUR
PT RESTING IN SEMIFOWLERS POSITION IN BED ALERT ADN WATCHING TV. CALL LIGHT
AND H2O IN REACH. PT STATES HE HAS ALREADY PLACED HIS DINNER ORDER AND DENIES
NEEDS OR CONCERNS AT THIS TIME.

## 2020-02-07 NOTE — NUR
PT SITTING UP AT SIDE OF BED COLORING. PT APPEARS TO BE IN NO ACUTE DISTRESS
ON 3LPNC. CALL LIGHT AND H2O IN REACH.

## 2020-02-07 NOTE — EKG
[facility]
                                    [address]
                                  [place]  [address]
_________________________________________________________________________________________
                                                                 Signed   
 
 
Atrial fibrillation
ST \T\ T wave abnormality, consider inferior ischemia Abnormal ECG When compared with
ECG of 26-FEB-2019 09:49,
T wave inversion now evident in Inferior leads
Confirmed by TALISHA HENDERSON MD ([id number]) on 2/7/2020 7:53:49 AM
 
 
 
 
 
 
 
 
 
 
 
 
 
 
 
 
 
 
 
 
 
 
 
 
 
 
 
 
 
 
 
 
 
 
 
 
 
 
 
 
    Electronically Signed By: TALISHA HENDERSON MD  02/07/20 0754
_________________________________________________________________________________________
PATIENT NAME:     PARISH HURTADO            
MEDICAL RECORD #: [id number]                     Electrocardiogram             
          ACCT #: [id number]  
DATE OF BIRTH:   [birth year]                                       
PHYSICIAN:   TALISHA HENDERSON MD                   REPORT #: [id number]
REPORT IS CONFIDENTIAL AND NOT TO BE RELEASED WITHOUT AUTHORIZATION

## 2020-02-07 NOTE — NUR
PT RESTING WITH EYES CLOSED, BREATHING EVEN AND UNLABORED RR=15. CALL LIGHT
WITHIN REACH. VISIBLE FROM NURSES STATION.

## 2020-02-07 NOTE — NUR
PRN NORCO ADMINISTERED FOR 7/10 GENERALIZED PAIN FROM HIPS TO NECK. PT
RESTLESS AND FIGETING IN BED. PT ASKING TO SPEAK WITH DAUGHTER, THIS RN
ASSISTED PT WITH CALLING DAUGHTER. NO ADDITIONAL NEEDS. CALL LIGHT IN REACH.

## 2020-02-07 NOTE — NUR
Spoke with Kenton.  Memory is poor, is not aware of date or year.  Does know
his name.  Lives in an apartment with his daughter and grandson. Both provide
him assistance.  Pt states he has a nebulizer and a wc at home.  Has a paid
state caregiver, but is unsure of hour or who it is.  Wants to return home
with family on dc.

## 2020-02-07 NOTE — NUR
CARDIZEM DRIP TURNED OFF AT THIS TIME. PT HEART RATE 85 BPM. PT CONTINUES TO
HAVE JERKY MOVEMENTS, AND ONGOING TALKING. BLOOD CONTINUES TO INFUSE AT THIS
TIME.

## 2020-02-07 NOTE — NUR
PT ARRIVED FROM CCU VIA BED, PT ALERT AND ORIENTED TO NEW ROOM AND CALL LIGHT.
ASSESSMENT COMPLETED AND VS'S TAKEN. PUDDING PROVIDED AND LUNCH ORDERED. BED
ALARM ON IVF INFUSING AS ORDERED.

## 2020-02-07 NOTE — NUR
2ND UNIT OF BLOOD FINISHED INFUSING. HEART RATE IN THE 80'S AT THIS TIME. PT
RESTING IN BED, ATTEMPTING TO CALM MOVEMENTS. ARMS AND LEGS STILL JERKING IN
BED. PT REQUIRING FREQUENT REORIENTATION.

## 2020-02-07 NOTE — NUR
PT HAD AN INCONTINENT BOWEL MOVEMENT. DEPENDS CHANGED. PT NOW RESTING IN BED.
WITH EYES CLOSED ARMS AND LEGS NO LONGER JERKING. EYES CLOSED. BREATHING EVEN
AND UNLABORED. VISIBLE FROM NURSES STATION. CALL LIGHT WITHIN REACH. NO
FURTHER ASSESSED NEEDS AT THIS TIME.

## 2020-02-07 NOTE — NUR
ASSESSMENT COMPLETE, SCHEDULED MEDS GIVEN (SEE EMAR). PT CONTINUES TO REPORT
8/10 PAIN. PT RESTING IN BED AND REMAINS FIDGETING. PT LAUGHING AND
INTERACTING WITH NURSING
STAFF AND COLORING. NO CRYING, MOANING, OR SIGNS OF DISTRESS NOTED. PT A/O TO
SELF, PLACE, AND EVENTS. REORIENTED TO DATE.
VSS, PT ON
TELE #6, SINUS RHYTHM. RATE 80-90'S. IV SITE TO RIGHT FOREARM WNL, BLOOD
RETURN NOTED. FLUIDS INFUSING AT 100MLS/HR. SITE TO LEFT FOREARM LEAKING WHEN
FLUSHED, SITE DISCONTINUED BY THIS RN. CATHETER TIP INTACT. SITE REINFORCED
WITH COBAN AND 2X2 GAUZE. PT DENIES NAUSEA, BT ACTIVE. NO FURTHER NEEDS, CALL
LIGHT IN REACH. PT IN VIEW OF NURSE'S STATION.

## 2020-02-07 NOTE — NUR
ASSESSMENT COMPLETED AT THIS TIME. PT SLEPT THROUGH ASSESSMENT. RESTING ON
RIGHT SIDE AT THIS TIME. CALL LIGHT WITHIN REACH. VISIBLE FROM NURSES STATION.
NO ASSESSED NEEDS AT THIS TIME.

## 2020-02-07 NOTE — NUR
PT HAS BEEN SITTING UP IN BED COLORING FOR THE PAST SEVERAL HOURS. PT NOW
REPORTS SHARP STABING PAIN TO LEFT INTERCOSTAL REGION WITH INCRESED PAIN WITH
MOVEMENT AND WITH PALPATION TO AREA, PT ALSO REPORTS SIMILAR PAIN TO MID UPPER
ABDOMEN. P TNOT DUE FOR PRN PO TYLENOL UNTIL 10PM. WARM PACK APPLIED TO
AFFECTED AREA BUT PT STILL REPORTING 10/10 SHARP STABBING PAIN AND IS
GRIMMACING, MOANING AND FIDGETING. VS'S STABLE. DR HENDERSON NOTIFIED OF SIGNS AND
SYMPTOMS. NEW ORDER RECEIVED FOR PRN PO NORCO 5/325 Q4HRS PAIN. NO FURTHER
ORDERS AT THIS TIME. RT IN PROVIDING BREATHING TX. REPORT TO HILARY ORDONEZ.

## 2020-02-07 NOTE — NUR
PT CONTINUES TO MOVE AROUND INS STRETCEHR, WITH QUICK JERKY MOVEMENTS. TALKING
NON STOP. ANSWERS QUESTIONS ABOUT PAIN APPROPRIATELY. HEART RATE 80-90 AT THIS
TIME. CARDIZEM DRIP TITRATED DOWN TO 2.5 MG/HR AT THIS TIME.

## 2020-02-07 NOTE — NUR
PATIENT UP AND WORKED WITH PHYSICAL THERAPY. PATIENT TOELRATED WELL. PATIENT
ENCOURAGED TO TASKE DEEP BREATHS WHILE AMBULATING, REMINDED PATIENT NOT TO
TALK SO MUCH WHILKE WORKING SO HER CAN BREATH BETTER. PATIENT IS A VERY
TALKATIVE PERSON BASELINE. PATIENT BACK TO BED. WILL CONTINUE TO CLOSELY
MONITOR.

## 2020-02-07 NOTE — NUR
PT REPORT RECIEVED FROM ER RN. PT BROUGHT TO CCU VIA STRETCHER ON CARDIAC
MONITOR, CARDIZEM DRIP INFUSING AT 5 MG/HR. SECOND UNIT OF BLOOD HOOKED UP TO
PT RIGHT IV BUT NOT INFUSING. PT RESTLESS, JERKING AROUND IN BED, TALKING NON
STOP. DISORIENTED TO DATE, TIME, PLACE. REPORTS 7/10 ABD PAIN AT THIS TIME.
PT HAS AUDIBLE EXPRIATORY WHEEZES. OCUGEN SATURATIONS AT 93 PERCENT ON 2 L NC.

## 2020-02-07 NOTE — NUR
PATIENT RESTING IN BED ON HIS SIDE AT THIS TIME. CLOSE OBSERVATION D/T PATIENT
BEING IMPULSIVE AT TIMES. NO OTHER NEEDS AT THIS TIME. WILL CONTINUE TO
ENCOURAGE REST AND CLOSELY MONITOR.

## 2020-02-07 NOTE — NUR
SHIFT REPORT RECEIVED FROM VERONICA FREEMAN AT BEDSIDE. PT AWAKE AND RESTING
IN BED. IV FLUIDS INFUSING, SITE WNL. HEAT PACK TO CHEST AREA, RECENTLY
PROVIDED BY LIAM. CALL LIGHT IN REACH, BED ALARM ON FOR SAFETY.

## 2020-02-07 NOTE — NUR
Pt spilt his coffee and plate on the floor. Helped pt clean this up. Pt states
"my coffee started to fall and I went to catch it and I accidentaly wacked it
off the table". No obvious deformity noted to pt's right hand. h2o and
call light in reach. No further needs or concerns voiced.

## 2020-02-07 NOTE — NUR
PT USED CALL LIGHT SERVERAL TIMES. HE REQUESTED TO USE THE COMODE 2X. PT
WAS UNABLE TO HAVE A BM HE REPORTED AB PAIN AND HAVING GAS. 2ND TIME PT HAD A
SOLID LG BM WELL TOLERATED. PT REQUESTED CLEAR DIET TRAY WAS ORDERED. PT DIET
WAS CHANGED TO A REGULAR AND PT REQUESTED COLD CEREAL. DR. HENDERSON WAS AT THE PT
BEDSIDE, INFORMED PT HE WAS GOING TO BE TRANSFORDED TO THE MED/SURG UNIT. PT
APPEARED TO BE RESTLESS HE CONTIOUSLY SAT UP IN HIS BED AND LAID BACK DOWN. HE
COMPLAINED OF BEING SHORT OF BREATH AT TIMES. NEB TREATMENT GIVEN. VITALS
COMPLETED. ASSESSMENT DONE. WILL CONTINUE TO CLOSELY MONITOR.

## 2020-02-08 NOTE — NUR
PT SITTING AT THE EDGE OF THE BED, ENSURE GIVEN WITH THIS MEAL ALSO AT THIS
TIME. PT SO FAR COOPERATIVE WITH HOSPITAL ROUTINE AND STAFF

## 2020-02-08 NOTE — NUR
PATIENT SITTING UP ON THE EDGE OF THE BED TAKING HIS BREAKFAST. CALL LIGHT
WITHIN REACH. NO OTHER NEEDS AT THIS TIME

## 2020-02-08 NOTE — NUR
PT RESTING QUIETLY IN BED, EYES CLOSED. IV FLUIDS INFUSING AT 100MLS/HR, SITE
WNL. BED ALARM ON. CALL LIGHT IN REACH.

## 2020-02-08 NOTE — NUR
HUNG NEW BAG OF IV FLUID. PT IS RESTING WITH EYES CLOSED, RR IS EVEN AND
NONLABORED. CALL LIGHT IS CLOSE AND BED ALARM IS ON.

## 2020-02-08 NOTE — NUR
PT UP TO THE BATHROOM ONE PERSON ASSISTANCE NEEDED, HAD BMA ND VOIDED IN THE
BATHROOM. PT FLUSHED BEFORE STAFF COULD SEE

## 2020-02-08 NOTE — NUR
ASSESSMENT COMPLETE, NO NEW CHANGES OR CONCERNS. PT RESTING IN BED, 2LNC IN
PLACE. TELE#6 IN PLACE, HR 75, SINUS RHYTHM. PT SLEPT THROUGH ASSESSMENT, NO
SIGNS OF PAIN OR DISTRESS NOTED. IV FLUIDS INFUSING AT 100MLS/HR, SITE WNL.
BED ALARM ON FOR SAFETY. CALL LIGHT IN REACH, PT IN VIEW OF NURSE'S STATION.

## 2020-02-08 NOTE — NUR
Av PT UP WALKING IN NULL WITH PHYS THER. AND CNA. PT IN GOOD MOOD AND NOT SOB
TODAY. TELE 4 REMOVED.

## 2020-02-08 NOTE — NUR
PT INITIALLY VERY FIDGETY AT BEGINNING OF SHIFT, PT REASSURED BY DAUGHTER OVER
THE PHONE. VSS, PT ON 2LNC. FORGETFUL AND IMPULSIVE AT TIMES, BED ALARM ON FOR
SAFETY. ORIENTED PRN. IV FLUIDS INFUSING AT 100MLS/HR, SITE WNL. REGULAR DIET,
TOLERATING WELL, NO NAUSEA REPORTED. PT 1-2PA, UNSTABLE ON FEET.

## 2020-02-08 NOTE — NUR
PATIENT SITTING UP ON THE EDGE OF THE BED. VITAL SIGNS AND I&O DONE. SETS UP
BATHROOM FOR SHOWER. BED ALARM ON. CALL LIGHT WITHIN REACH. NO OTHER NEEDS AT
THIS TIME

## 2020-02-08 NOTE — NUR
PT AWAKE AND RESTING IN BED. RECENTLY COLORING, BUT APPEARS TO BE WINDING DOWN
FOR BED. 2LNC IN PLACE, NO DISTRESS NOTED. TELE#6 IN PLACE, HR 74. SINUS
RHYTHM. IV FLUIDS INFUSING AT 100MLS/HR, SITE WNL. CALL LIGHT IN REACH, PT IN
VIEW OF NURSE'S STATION.

## 2020-02-08 NOTE — NUR
PT SITTING IN BED, ATTEMPTING TO GET OUT OF BED. THIS RN IN ROOM, ATTEMPTED TO
REORIENT PT. WHEN ASKED WHERE HE WAS, PT STATES, "I DON'T KNOW". PT IRRITATED,
TALKING TO SELF AND SWEARING.
PT QUICKLY SETTLED DOWN AND IS NOW RESTING IN BED. PT IN VIEW
OF NURSE'S STATION. CALL LIGHT IN REACH.

## 2020-02-08 NOTE — NUR
pt to be discharge to home. pt daughter is aware and will be here in abut a
hour. Discharge instruction given to pt, but will also go over them with his
daughter due to she and a care providor will come to get him.

## 2020-02-08 NOTE — NUR
FAMILY HERE TO GET PT AT THIS TIME, ALL QUESTION ANSWERED AT THIS TIME. [facility]

## 2022-02-08 NOTE — NUR
1255: PT DC'D TO HOME WITH HIS DAUGHTER. VITAL SIGNS WERE NOT CHECKED AS
ORDERED NOT TO RECHECK BY DR LY. PT WAS WHEELCHAIRED TO THE CAR WITH O2 AT
4L AND THE PT WAS PLACED ON HIS OWN O2 IN THE CAR. THE PT'S DAUGHTER STATES
SHE WILL BE FINE GETTING THE PT INTO HIS HOME AS THERE ARE SEVERAL FAMILY
MEMEBERS AT HOME TO HELP. [FreeTextEntry2] : BIRTH CONTROL RENEWAL